# Patient Record
Sex: MALE | Race: WHITE | Employment: FULL TIME | URBAN - METROPOLITAN AREA
[De-identification: names, ages, dates, MRNs, and addresses within clinical notes are randomized per-mention and may not be internally consistent; named-entity substitution may affect disease eponyms.]

---

## 2021-03-22 ENCOUNTER — TRANSFERRED RECORDS (OUTPATIENT)
Dept: HEALTH INFORMATION MANAGEMENT | Facility: CLINIC | Age: 56
End: 2021-03-22

## 2021-03-24 RX ORDER — TRAMADOL HYDROCHLORIDE 50 MG/1
50 TABLET ORAL DAILY PRN
Status: ON HOLD | COMMUNITY
End: 2021-04-16

## 2021-03-24 RX ORDER — AMPICILLIN TRIHYDRATE 250 MG
200 CAPSULE ORAL EVERY MORNING
COMMUNITY

## 2021-03-24 RX ORDER — ATORVASTATIN CALCIUM 80 MG/1
80 TABLET, FILM COATED ORAL EVERY EVENING
COMMUNITY

## 2021-03-24 RX ORDER — LACTOBACILLUS RHAMNOSUS GG 10B CELL
1 CAPSULE ORAL EVERY MORNING
COMMUNITY

## 2021-03-24 RX ORDER — GLIPIZIDE 10 MG/1
10 TABLET, FILM COATED, EXTENDED RELEASE ORAL EVERY MORNING
COMMUNITY

## 2021-03-24 RX ORDER — MULTIVIT-MIN/IRON/FOLIC ACID/K 18-600-40
500 CAPSULE ORAL EVERY MORNING
COMMUNITY

## 2021-03-24 RX ORDER — METOPROLOL SUCCINATE 25 MG/1
25 TABLET, EXTENDED RELEASE ORAL EVERY MORNING
COMMUNITY

## 2021-03-24 RX ORDER — SULINDAC 200 MG/1
200 TABLET ORAL 2 TIMES DAILY
Status: ON HOLD | COMMUNITY
End: 2021-04-17

## 2021-03-24 RX ORDER — HYDROCODONE BITARTRATE AND ACETAMINOPHEN 5; 325 MG/1; MG/1
1 TABLET ORAL DAILY PRN
Status: ON HOLD | COMMUNITY
End: 2021-04-16

## 2021-03-24 RX ORDER — LOSARTAN POTASSIUM 50 MG/1
50 TABLET ORAL EVERY MORNING
COMMUNITY

## 2021-03-24 RX ORDER — ASPIRIN 81 MG/1
81 TABLET ORAL EVERY MORNING
Status: ON HOLD | COMMUNITY
End: 2021-04-16

## 2021-03-24 RX ORDER — FUROSEMIDE 20 MG
20 TABLET ORAL EVERY EVENING
COMMUNITY

## 2021-03-24 RX ORDER — MULTIVIT-MINERALS/FA/LYCOPENE 400-370MCG
1 TABLET ORAL EVERY MORNING
COMMUNITY

## 2021-03-24 RX ORDER — FUROSEMIDE 20 MG
40 TABLET ORAL EVERY MORNING
COMMUNITY

## 2021-03-30 DIAGNOSIS — Z11.59 ENCOUNTER FOR SCREENING FOR OTHER VIRAL DISEASES: ICD-10-CM

## 2021-04-13 DIAGNOSIS — Z11.59 ENCOUNTER FOR SCREENING FOR OTHER VIRAL DISEASES: ICD-10-CM

## 2021-04-13 LAB
SARS-COV-2 RNA RESP QL NAA+PROBE: NORMAL
SPECIMEN SOURCE: NORMAL

## 2021-04-13 PROCEDURE — 87635 SARS-COV-2 COVID-19 AMP PRB: CPT | Performed by: ORTHOPAEDIC SURGERY

## 2021-04-14 LAB
LABORATORY COMMENT REPORT: NORMAL
SARS-COV-2 RNA RESP QL NAA+PROBE: NEGATIVE
SPECIMEN SOURCE: NORMAL

## 2021-04-15 RX ORDER — CLINDAMYCIN HCL 300 MG
600 CAPSULE ORAL 3 TIMES DAILY
Status: ON HOLD | COMMUNITY
End: 2021-04-22

## 2021-04-15 RX ORDER — NITROGLYCERIN 0.4 MG/1
0.4 TABLET SUBLINGUAL DAILY PRN
COMMUNITY

## 2021-04-15 NOTE — PROGRESS NOTES
PTA medications updated by Medication Scribe prior to surgery via phone call with patient        Comments:    Medication history sources: Patient's family/friend (Melisa (spouse)) and H&P  In the past week, patient estimated taking medication this percent of the time: Greater than 90%  Adherence assessment: N/A Not Observed    Significant changes made to the medication list:  Patient reports no longer taking the following meds (med scribe removed from PTA med list): Albuterol Inhaler, Flovent Inhaler       Additional medication history information:   None    The information provided in this note is only as accurate as the sources available at the time of update(s)      Prior to Admission medications    Medication Sig Last Dose Taking? Auth Provider   Ascorbic Acid (VITAMIN C) 500 MG CAPS Take 500 mg by mouth every morning   at AM Yes Reported, Patient   aspirin 81 MG EC tablet Take 81 mg by mouth every morning  4/8/2021 at AM Yes Reported, Patient   atorvastatin (LIPITOR) 80 MG tablet Take 80 mg by mouth every evening  at PM Yes Reported, Patient   clindamycin (CLEOCIN) 300 MG capsule Take 600 mg by mouth 3 times daily  Yes Reported, Patient   Coenzyme Q10 (COQ10) 200 MG CAPS Take 200 mg by mouth every morning  4/8/2021 at AM Yes Reported, Patient   furosemide (LASIX) 20 MG tablet Take 40 mg by mouth every morning (2 X 20 mg) (in addition to PM dose) 4/15/2021 at AM Yes Reported, Patient   furosemide (LASIX) 20 MG tablet Take 20 mg by mouth every evening (in addition to morning dose)  at PM Yes Reported, Patient   glipiZIDE (GLUCOTROL XL) 10 MG 24 hr tablet Take 10 mg by mouth every morning  4/15/2021 at AM Yes Reported, Patient   HYDROcodone-acetaminophen (NORCO) 5-325 MG tablet Take 1 tablet by mouth daily as needed for severe pain 4/12/2021 at PRN Yes Reported, Patient   lactobacillus rhamnosus, GG, (CULTURELL) capsule Take 1 capsule by mouth every morning   at AM Yes Reported, Patient   losartan (COZAAR) 50 MG  tablet Take 50 mg by mouth every morning   at AM Yes Reported, Patient   metoprolol succinate ER (TOPROL-XL) 25 MG 24 hr tablet Take 25 mg by mouth every morning   at AM Yes Reported, Patient   Multiple Vitamins-Minerals (ONE-A-DAY MENS 50+) TABS Take 1 tablet by mouth every morning   at AM Yes Reported, Patient   nitroGLYcerin (NITROSTAT) 0.4 MG sublingual tablet Place 0.4 mg under the tongue daily as needed for chest pain For chest pain place 1 tablet under the tongue every 5 minutes for 3 doses. If symptoms persist 5 minutes after 1st dose call 911.  at PRN Yes Reported, Patient   sulindac (CLINORIL) 200 MG tablet Take 200 mg by mouth 2 times daily  at AM Yes Reported, Patient   traMADol (ULTRAM) 50 MG tablet Take 50 mg by mouth daily as needed for severe pain  at PRN Yes Reported, Patient         Christopher Lang, Imelda  Medication [facility]

## 2021-04-16 ENCOUNTER — ANESTHESIA EVENT (OUTPATIENT)
Dept: SURGERY | Facility: CLINIC | Age: 56
End: 2021-04-16
Payer: COMMERCIAL

## 2021-04-16 ENCOUNTER — ANESTHESIA (OUTPATIENT)
Dept: SURGERY | Facility: CLINIC | Age: 56
End: 2021-04-16
Payer: COMMERCIAL

## 2021-04-16 ENCOUNTER — APPOINTMENT (OUTPATIENT)
Dept: PHYSICAL THERAPY | Facility: CLINIC | Age: 56
End: 2021-04-16
Attending: ORTHOPAEDIC SURGERY
Payer: COMMERCIAL

## 2021-04-16 ENCOUNTER — HOSPITAL ENCOUNTER (OUTPATIENT)
Facility: CLINIC | Age: 56
Discharge: HOME | End: 2021-04-17
Attending: ORTHOPAEDIC SURGERY | Admitting: ORTHOPAEDIC SURGERY
Payer: COMMERCIAL

## 2021-04-16 DIAGNOSIS — Z96.651 STATUS POST TOTAL RIGHT KNEE REPLACEMENT: Primary | ICD-10-CM

## 2021-04-16 LAB
CREAT SERPL-MCNC: 0.88 MG/DL (ref 0.66–1.25)
GFR SERPL CREATININE-BSD FRML MDRD: >90 ML/MIN/{1.73_M2}
GLUCOSE BLDC GLUCOMTR-MCNC: 140 MG/DL (ref 70–99)
GLUCOSE BLDC GLUCOMTR-MCNC: 178 MG/DL (ref 70–99)
GLUCOSE BLDC GLUCOMTR-MCNC: 235 MG/DL (ref 70–99)
GLUCOSE SERPL-MCNC: 122 MG/DL (ref 70–99)
POTASSIUM SERPL-SCNC: 4.1 MMOL/L (ref 3.4–5.3)

## 2021-04-16 PROCEDURE — 250N000011 HC RX IP 250 OP 636: Performed by: ANESTHESIOLOGY

## 2021-04-16 PROCEDURE — 36415 COLL VENOUS BLD VENIPUNCTURE: CPT | Performed by: ANESTHESIOLOGY

## 2021-04-16 PROCEDURE — C1776 JOINT DEVICE (IMPLANTABLE): HCPCS | Performed by: ORTHOPAEDIC SURGERY

## 2021-04-16 PROCEDURE — 97110 THERAPEUTIC EXERCISES: CPT | Mod: GP

## 2021-04-16 PROCEDURE — 258N000001 HC RX 258: Performed by: ORTHOPAEDIC SURGERY

## 2021-04-16 PROCEDURE — 82962 GLUCOSE BLOOD TEST: CPT

## 2021-04-16 PROCEDURE — 258N000003 HC RX IP 258 OP 636: Performed by: NURSE ANESTHETIST, CERTIFIED REGISTERED

## 2021-04-16 PROCEDURE — 84132 ASSAY OF SERUM POTASSIUM: CPT | Performed by: ANESTHESIOLOGY

## 2021-04-16 PROCEDURE — 97161 PT EVAL LOW COMPLEX 20 MIN: CPT | Mod: GP

## 2021-04-16 PROCEDURE — 250N000012 HC RX MED GY IP 250 OP 636 PS 637: Performed by: PHYSICIAN ASSISTANT

## 2021-04-16 PROCEDURE — P9041 ALBUMIN (HUMAN),5%, 50ML: HCPCS | Performed by: NURSE ANESTHETIST, CERTIFIED REGISTERED

## 2021-04-16 PROCEDURE — 250N000011 HC RX IP 250 OP 636: Performed by: ORTHOPAEDIC SURGERY

## 2021-04-16 PROCEDURE — 250N000011 HC RX IP 250 OP 636: Performed by: NURSE ANESTHETIST, CERTIFIED REGISTERED

## 2021-04-16 PROCEDURE — 250N000009 HC RX 250: Performed by: ANESTHESIOLOGY

## 2021-04-16 PROCEDURE — 272N000001 HC OR GENERAL SUPPLY STERILE: Performed by: ORTHOPAEDIC SURGERY

## 2021-04-16 PROCEDURE — 97116 GAIT TRAINING THERAPY: CPT | Mod: GP

## 2021-04-16 PROCEDURE — 250N000011 HC RX IP 250 OP 636: Performed by: PHYSICIAN ASSISTANT

## 2021-04-16 PROCEDURE — 360N000077 HC SURGERY LEVEL 4, PER MIN: Performed by: ORTHOPAEDIC SURGERY

## 2021-04-16 PROCEDURE — 258N000003 HC RX IP 258 OP 636: Performed by: ANESTHESIOLOGY

## 2021-04-16 PROCEDURE — 710N000009 HC RECOVERY PHASE 1, LEVEL 1, PER MIN: Performed by: ORTHOPAEDIC SURGERY

## 2021-04-16 PROCEDURE — 250N000013 HC RX MED GY IP 250 OP 250 PS 637: Performed by: PHYSICIAN ASSISTANT

## 2021-04-16 PROCEDURE — 99207 PR CONSULT E&M CHANGED TO SUBSEQUENT LEVEL: CPT | Performed by: PHYSICIAN ASSISTANT

## 2021-04-16 PROCEDURE — 258N000003 HC RX IP 258 OP 636: Performed by: PHYSICIAN ASSISTANT

## 2021-04-16 PROCEDURE — 278N000051 HC OR IMPLANT GENERAL: Performed by: ORTHOPAEDIC SURGERY

## 2021-04-16 PROCEDURE — 99225 PR SUBSEQUENT OBSERVATION CARE,LEVEL II: CPT | Performed by: PHYSICIAN ASSISTANT

## 2021-04-16 PROCEDURE — 82565 ASSAY OF CREATININE: CPT | Performed by: ANESTHESIOLOGY

## 2021-04-16 PROCEDURE — 82947 ASSAY GLUCOSE BLOOD QUANT: CPT | Performed by: ANESTHESIOLOGY

## 2021-04-16 PROCEDURE — 250N000009 HC RX 250: Performed by: NURSE ANESTHETIST, CERTIFIED REGISTERED

## 2021-04-16 PROCEDURE — 999N000141 HC STATISTIC PRE-PROCEDURE NURSING ASSESSMENT: Performed by: ORTHOPAEDIC SURGERY

## 2021-04-16 PROCEDURE — 370N000017 HC ANESTHESIA TECHNICAL FEE, PER MIN: Performed by: ORTHOPAEDIC SURGERY

## 2021-04-16 PROCEDURE — 96372 THER/PROPH/DIAG INJ SC/IM: CPT | Performed by: PHYSICIAN ASSISTANT

## 2021-04-16 DEVICE — ATTUNE KNEE SYSTEM TIBIAL BASE ROTATING PLATFORM SIZE 6 CEMENTED
Type: IMPLANTABLE DEVICE | Site: KNEE | Status: FUNCTIONAL
Brand: ATTUNE

## 2021-04-16 DEVICE — ATTUNE KNEE SYSTEM TIBIAL INSERT ROTATING PLATFORM POSTERIOR STABILIZED 6 7MM AOX
Type: IMPLANTABLE DEVICE | Site: KNEE | Status: FUNCTIONAL
Brand: ATTUNE

## 2021-04-16 DEVICE — ATTUNE KNEE SYSTEM FEMORAL POSTERIOR STABILIZED SIZE 6 RIGHT CEMENTED
Type: IMPLANTABLE DEVICE | Site: KNEE | Status: FUNCTIONAL
Brand: ATTUNE

## 2021-04-16 DEVICE — BONE CEMENT SIMPLEX 1/2 DOSE 6188-1-001: Type: IMPLANTABLE DEVICE | Site: KNEE | Status: FUNCTIONAL

## 2021-04-16 DEVICE — ATTUNE PATELLA MEDIALIZED DOME 38MM CEMENTED AOX
Type: IMPLANTABLE DEVICE | Site: KNEE | Status: FUNCTIONAL
Brand: ATTUNE

## 2021-04-16 RX ORDER — LIDOCAINE 40 MG/G
CREAM TOPICAL
Status: DISCONTINUED | OUTPATIENT
Start: 2021-04-16 | End: 2021-04-16 | Stop reason: HOSPADM

## 2021-04-16 RX ORDER — ONDANSETRON 2 MG/ML
4 INJECTION INTRAMUSCULAR; INTRAVENOUS EVERY 6 HOURS PRN
Status: DISCONTINUED | OUTPATIENT
Start: 2021-04-16 | End: 2021-04-17 | Stop reason: HOSPADM

## 2021-04-16 RX ORDER — LIDOCAINE 40 MG/G
CREAM TOPICAL
Status: DISCONTINUED | OUTPATIENT
Start: 2021-04-16 | End: 2021-04-17 | Stop reason: HOSPADM

## 2021-04-16 RX ORDER — AMOXICILLIN 250 MG
1 CAPSULE ORAL 2 TIMES DAILY
Status: DISCONTINUED | OUTPATIENT
Start: 2021-04-16 | End: 2021-04-17 | Stop reason: HOSPADM

## 2021-04-16 RX ORDER — SODIUM CHLORIDE, SODIUM LACTATE, POTASSIUM CHLORIDE, CALCIUM CHLORIDE 600; 310; 30; 20 MG/100ML; MG/100ML; MG/100ML; MG/100ML
INJECTION, SOLUTION INTRAVENOUS CONTINUOUS
Status: DISCONTINUED | OUTPATIENT
Start: 2021-04-16 | End: 2021-04-16 | Stop reason: HOSPADM

## 2021-04-16 RX ORDER — DEXTROSE MONOHYDRATE 25 G/50ML
25-50 INJECTION, SOLUTION INTRAVENOUS
Status: DISCONTINUED | OUTPATIENT
Start: 2021-04-16 | End: 2021-04-17 | Stop reason: HOSPADM

## 2021-04-16 RX ORDER — CELECOXIB 200 MG/1
200 CAPSULE ORAL DAILY
Qty: 14 CAPSULE | Refills: 0 | Status: SHIPPED | OUTPATIENT
Start: 2021-04-16 | End: 2021-04-17

## 2021-04-16 RX ORDER — FENTANYL CITRATE 50 UG/ML
50-100 INJECTION, SOLUTION INTRAMUSCULAR; INTRAVENOUS
Status: DISCONTINUED | OUTPATIENT
Start: 2021-04-16 | End: 2021-04-16 | Stop reason: HOSPADM

## 2021-04-16 RX ORDER — NALOXONE HYDROCHLORIDE 0.4 MG/ML
0.2 INJECTION, SOLUTION INTRAMUSCULAR; INTRAVENOUS; SUBCUTANEOUS
Status: DISCONTINUED | OUTPATIENT
Start: 2021-04-16 | End: 2021-04-16

## 2021-04-16 RX ORDER — ONDANSETRON 4 MG/1
4 TABLET, ORALLY DISINTEGRATING ORAL EVERY 30 MIN PRN
Status: DISCONTINUED | OUTPATIENT
Start: 2021-04-16 | End: 2021-04-16 | Stop reason: HOSPADM

## 2021-04-16 RX ORDER — HYDROMORPHONE HCL IN WATER/PF 6 MG/30 ML
0.2 PATIENT CONTROLLED ANALGESIA SYRINGE INTRAVENOUS
Status: DISCONTINUED | OUTPATIENT
Start: 2021-04-16 | End: 2021-04-17 | Stop reason: HOSPADM

## 2021-04-16 RX ORDER — DEXAMETHASONE SODIUM PHOSPHATE 4 MG/ML
INJECTION, SOLUTION INTRA-ARTICULAR; INTRALESIONAL; INTRAMUSCULAR; INTRAVENOUS; SOFT TISSUE PRN
Status: DISCONTINUED | OUTPATIENT
Start: 2021-04-16 | End: 2021-04-16

## 2021-04-16 RX ORDER — TRANEXAMIC ACID 650 MG/1
1950 TABLET ORAL ONCE
Status: DISCONTINUED | OUTPATIENT
Start: 2021-04-16 | End: 2021-04-16 | Stop reason: HOSPADM

## 2021-04-16 RX ORDER — ACETAMINOPHEN 325 MG/1
975 TABLET ORAL EVERY 8 HOURS
Status: DISCONTINUED | OUTPATIENT
Start: 2021-04-16 | End: 2021-04-17 | Stop reason: HOSPADM

## 2021-04-16 RX ORDER — NICOTINE POLACRILEX 4 MG
15-30 LOZENGE BUCCAL
Status: DISCONTINUED | OUTPATIENT
Start: 2021-04-16 | End: 2021-04-17 | Stop reason: HOSPADM

## 2021-04-16 RX ORDER — SODIUM CHLORIDE, SODIUM LACTATE, POTASSIUM CHLORIDE, CALCIUM CHLORIDE 600; 310; 30; 20 MG/100ML; MG/100ML; MG/100ML; MG/100ML
INJECTION, SOLUTION INTRAVENOUS CONTINUOUS
Status: DISCONTINUED | OUTPATIENT
Start: 2021-04-16 | End: 2021-04-17 | Stop reason: HOSPADM

## 2021-04-16 RX ORDER — HYDROXYZINE HYDROCHLORIDE 25 MG/1
25 TABLET, FILM COATED ORAL EVERY 6 HOURS PRN
Status: DISCONTINUED | OUTPATIENT
Start: 2021-04-16 | End: 2021-04-17 | Stop reason: HOSPADM

## 2021-04-16 RX ORDER — CEFAZOLIN SODIUM IN 0.9 % NACL 3 G/100 ML
3 INTRAVENOUS SOLUTION, PIGGYBACK (ML) INTRAVENOUS
Status: COMPLETED | OUTPATIENT
Start: 2021-04-16 | End: 2021-04-16

## 2021-04-16 RX ORDER — DOCUSATE SODIUM 100 MG/1
100 CAPSULE, LIQUID FILLED ORAL 2 TIMES DAILY
Status: DISCONTINUED | OUTPATIENT
Start: 2021-04-16 | End: 2021-04-17 | Stop reason: HOSPADM

## 2021-04-16 RX ORDER — ONDANSETRON 2 MG/ML
INJECTION INTRAMUSCULAR; INTRAVENOUS PRN
Status: DISCONTINUED | OUTPATIENT
Start: 2021-04-16 | End: 2021-04-16

## 2021-04-16 RX ORDER — NALOXONE HYDROCHLORIDE 0.4 MG/ML
0.4 INJECTION, SOLUTION INTRAMUSCULAR; INTRAVENOUS; SUBCUTANEOUS
Status: DISCONTINUED | OUTPATIENT
Start: 2021-04-16 | End: 2021-04-16

## 2021-04-16 RX ORDER — NALOXONE HYDROCHLORIDE 0.4 MG/ML
0.4 INJECTION, SOLUTION INTRAMUSCULAR; INTRAVENOUS; SUBCUTANEOUS
Status: DISCONTINUED | OUTPATIENT
Start: 2021-04-16 | End: 2021-04-17 | Stop reason: HOSPADM

## 2021-04-16 RX ORDER — METHOCARBAMOL 750 MG/1
750 TABLET, FILM COATED ORAL EVERY 6 HOURS PRN
Status: DISCONTINUED | OUTPATIENT
Start: 2021-04-16 | End: 2021-04-17 | Stop reason: HOSPADM

## 2021-04-16 RX ORDER — BISACODYL 10 MG
10 SUPPOSITORY, RECTAL RECTAL DAILY PRN
Status: DISCONTINUED | OUTPATIENT
Start: 2021-04-16 | End: 2021-04-17 | Stop reason: HOSPADM

## 2021-04-16 RX ORDER — HYDROXYZINE HYDROCHLORIDE 25 MG/1
25 TABLET, FILM COATED ORAL EVERY 6 HOURS PRN
Qty: 30 TABLET | Refills: 0 | Status: SHIPPED | OUTPATIENT
Start: 2021-04-16

## 2021-04-16 RX ORDER — ALBUMIN, HUMAN INJ 5% 5 %
SOLUTION INTRAVENOUS CONTINUOUS PRN
Status: DISCONTINUED | OUTPATIENT
Start: 2021-04-16 | End: 2021-04-16

## 2021-04-16 RX ORDER — CEFAZOLIN SODIUM 2 G/100ML
2 INJECTION, SOLUTION INTRAVENOUS EVERY 8 HOURS
Status: COMPLETED | OUTPATIENT
Start: 2021-04-16 | End: 2021-04-17

## 2021-04-16 RX ORDER — CEFAZOLIN SODIUM IN 0.9 % NACL 3 G/100 ML
3 INTRAVENOUS SOLUTION, PIGGYBACK (ML) INTRAVENOUS
Status: CANCELLED | OUTPATIENT
Start: 2021-04-16

## 2021-04-16 RX ORDER — OXYCODONE HYDROCHLORIDE 5 MG/1
10 TABLET ORAL EVERY 4 HOURS PRN
Status: DISCONTINUED | OUTPATIENT
Start: 2021-04-16 | End: 2021-04-17 | Stop reason: HOSPADM

## 2021-04-16 RX ORDER — PROPOFOL 10 MG/ML
INJECTION, EMULSION INTRAVENOUS CONTINUOUS PRN
Status: DISCONTINUED | OUTPATIENT
Start: 2021-04-16 | End: 2021-04-16

## 2021-04-16 RX ORDER — POLYETHYLENE GLYCOL 3350 17 G/17G
17 POWDER, FOR SOLUTION ORAL DAILY
Status: DISCONTINUED | OUTPATIENT
Start: 2021-04-17 | End: 2021-04-17 | Stop reason: HOSPADM

## 2021-04-16 RX ORDER — HYDROMORPHONE HYDROCHLORIDE 1 MG/ML
0.4 INJECTION, SOLUTION INTRAMUSCULAR; INTRAVENOUS; SUBCUTANEOUS
Status: DISCONTINUED | OUTPATIENT
Start: 2021-04-16 | End: 2021-04-17 | Stop reason: HOSPADM

## 2021-04-16 RX ORDER — OXYCODONE HYDROCHLORIDE 5 MG/1
5 TABLET ORAL EVERY 4 HOURS PRN
Status: DISCONTINUED | OUTPATIENT
Start: 2021-04-16 | End: 2021-04-17 | Stop reason: HOSPADM

## 2021-04-16 RX ORDER — ONDANSETRON 4 MG/1
4 TABLET, ORALLY DISINTEGRATING ORAL EVERY 6 HOURS PRN
Status: DISCONTINUED | OUTPATIENT
Start: 2021-04-16 | End: 2021-04-17 | Stop reason: HOSPADM

## 2021-04-16 RX ORDER — NALOXONE HYDROCHLORIDE 0.4 MG/ML
0.2 INJECTION, SOLUTION INTRAMUSCULAR; INTRAVENOUS; SUBCUTANEOUS
Status: DISCONTINUED | OUTPATIENT
Start: 2021-04-16 | End: 2021-04-17 | Stop reason: HOSPADM

## 2021-04-16 RX ORDER — ACETAMINOPHEN 325 MG/1
650 TABLET ORAL EVERY 4 HOURS PRN
Qty: 100 TABLET | Refills: 0 | Status: SHIPPED | OUTPATIENT
Start: 2021-04-16

## 2021-04-16 RX ORDER — METOPROLOL SUCCINATE 25 MG/1
25 TABLET, EXTENDED RELEASE ORAL EVERY MORNING
Status: DISCONTINUED | OUTPATIENT
Start: 2021-04-17 | End: 2021-04-17 | Stop reason: HOSPADM

## 2021-04-16 RX ORDER — HYDROMORPHONE HYDROCHLORIDE 1 MG/ML
.3-.5 INJECTION, SOLUTION INTRAMUSCULAR; INTRAVENOUS; SUBCUTANEOUS EVERY 5 MIN PRN
Status: DISCONTINUED | OUTPATIENT
Start: 2021-04-16 | End: 2021-04-16 | Stop reason: HOSPADM

## 2021-04-16 RX ORDER — CEFAZOLIN SODIUM 2 G/100ML
2 INJECTION, SOLUTION INTRAVENOUS
Status: DISCONTINUED | OUTPATIENT
Start: 2021-04-16 | End: 2021-04-16 | Stop reason: HOSPADM

## 2021-04-16 RX ORDER — FENTANYL CITRATE 50 UG/ML
25-50 INJECTION, SOLUTION INTRAMUSCULAR; INTRAVENOUS
Status: DISCONTINUED | OUTPATIENT
Start: 2021-04-16 | End: 2021-04-16 | Stop reason: HOSPADM

## 2021-04-16 RX ORDER — ACETAMINOPHEN 325 MG/1
650 TABLET ORAL EVERY 4 HOURS PRN
Status: DISCONTINUED | OUTPATIENT
Start: 2021-04-19 | End: 2021-04-17 | Stop reason: HOSPADM

## 2021-04-16 RX ORDER — FENTANYL CITRATE 50 UG/ML
INJECTION, SOLUTION INTRAMUSCULAR; INTRAVENOUS PRN
Status: DISCONTINUED | OUTPATIENT
Start: 2021-04-16 | End: 2021-04-16

## 2021-04-16 RX ORDER — AMOXICILLIN 250 MG
1-2 CAPSULE ORAL 2 TIMES DAILY
Qty: 30 TABLET | Refills: 0 | Status: SHIPPED | OUTPATIENT
Start: 2021-04-16

## 2021-04-16 RX ORDER — LOSARTAN POTASSIUM 50 MG/1
50 TABLET ORAL EVERY MORNING
Status: DISCONTINUED | OUTPATIENT
Start: 2021-04-17 | End: 2021-04-17 | Stop reason: HOSPADM

## 2021-04-16 RX ORDER — PROCHLORPERAZINE MALEATE 10 MG
10 TABLET ORAL EVERY 6 HOURS PRN
Status: DISCONTINUED | OUTPATIENT
Start: 2021-04-16 | End: 2021-04-17 | Stop reason: HOSPADM

## 2021-04-16 RX ORDER — METOPROLOL TARTRATE 1 MG/ML
5 INJECTION, SOLUTION INTRAVENOUS EVERY 6 HOURS
Status: DISCONTINUED | OUTPATIENT
Start: 2021-04-17 | End: 2021-04-16

## 2021-04-16 RX ORDER — DIPHENHYDRAMINE HCL 25 MG
25 CAPSULE ORAL EVERY 6 HOURS PRN
Status: DISCONTINUED | OUTPATIENT
Start: 2021-04-16 | End: 2021-04-17 | Stop reason: HOSPADM

## 2021-04-16 RX ORDER — BUPIVACAINE HYDROCHLORIDE 7.5 MG/ML
INJECTION, SOLUTION INTRASPINAL PRN
Status: DISCONTINUED | OUTPATIENT
Start: 2021-04-16 | End: 2021-04-16

## 2021-04-16 RX ORDER — ONDANSETRON 2 MG/ML
4 INJECTION INTRAMUSCULAR; INTRAVENOUS EVERY 30 MIN PRN
Status: DISCONTINUED | OUTPATIENT
Start: 2021-04-16 | End: 2021-04-16 | Stop reason: HOSPADM

## 2021-04-16 RX ORDER — KETOROLAC TROMETHAMINE 15 MG/ML
15 INJECTION, SOLUTION INTRAMUSCULAR; INTRAVENOUS EVERY 6 HOURS
Status: COMPLETED | OUTPATIENT
Start: 2021-04-16 | End: 2021-04-17

## 2021-04-16 RX ORDER — CEFAZOLIN SODIUM 2 G/100ML
2 INJECTION, SOLUTION INTRAVENOUS SEE ADMIN INSTRUCTIONS
Status: DISCONTINUED | OUTPATIENT
Start: 2021-04-16 | End: 2021-04-16 | Stop reason: HOSPADM

## 2021-04-16 RX ORDER — OXYCODONE HYDROCHLORIDE 5 MG/1
5-10 TABLET ORAL
Qty: 30 TABLET | Refills: 0 | Status: ON HOLD | OUTPATIENT
Start: 2021-04-16 | End: 2021-04-22

## 2021-04-16 RX ADMIN — Medication 3 G: at 07:52

## 2021-04-16 RX ADMIN — PHENYLEPHRINE HYDROCHLORIDE 100 MCG: 10 INJECTION INTRAVENOUS at 07:59

## 2021-04-16 RX ADMIN — DOCUSATE SODIUM 100 MG: 100 CAPSULE, LIQUID FILLED ORAL at 20:07

## 2021-04-16 RX ADMIN — FENTANYL CITRATE 25 MCG: 50 INJECTION, SOLUTION INTRAMUSCULAR; INTRAVENOUS at 09:36

## 2021-04-16 RX ADMIN — SODIUM CHLORIDE, POTASSIUM CHLORIDE, SODIUM LACTATE AND CALCIUM CHLORIDE: 600; 310; 30; 20 INJECTION, SOLUTION INTRAVENOUS at 08:44

## 2021-04-16 RX ADMIN — ONDANSETRON 4 MG: 2 INJECTION INTRAMUSCULAR; INTRAVENOUS at 07:51

## 2021-04-16 RX ADMIN — BUPIVACAINE HYDROCHLORIDE 15 ML: 5 INJECTION, SOLUTION EPIDURAL; INTRACAUDAL; PERINEURAL at 07:04

## 2021-04-16 RX ADMIN — PHENYLEPHRINE HYDROCHLORIDE 100 MCG: 10 INJECTION INTRAVENOUS at 08:03

## 2021-04-16 RX ADMIN — Medication 2 G: at 09:50

## 2021-04-16 RX ADMIN — SENNOSIDES AND DOCUSATE SODIUM 1 TABLET: 8.6; 5 TABLET ORAL at 20:07

## 2021-04-16 RX ADMIN — PHENYLEPHRINE HYDROCHLORIDE 200 MCG: 10 INJECTION INTRAVENOUS at 08:11

## 2021-04-16 RX ADMIN — KETOROLAC TROMETHAMINE 15 MG: 15 INJECTION, SOLUTION INTRAMUSCULAR; INTRAVENOUS at 20:07

## 2021-04-16 RX ADMIN — PHENYLEPHRINE HYDROCHLORIDE 100 MCG: 10 INJECTION INTRAVENOUS at 07:54

## 2021-04-16 RX ADMIN — PHENYLEPHRINE HYDROCHLORIDE 0.25 MCG/KG/MIN: 10 INJECTION INTRAVENOUS at 08:26

## 2021-04-16 RX ADMIN — BUPIVACAINE HYDROCHLORIDE IN DEXTROSE 12 MG: 7.5 INJECTION, SOLUTION SUBARACHNOID at 07:49

## 2021-04-16 RX ADMIN — MIDAZOLAM 1 MG: 1 INJECTION INTRAMUSCULAR; INTRAVENOUS at 07:43

## 2021-04-16 RX ADMIN — DEXAMETHASONE SODIUM PHOSPHATE 4 MG: 4 INJECTION, SOLUTION INTRA-ARTICULAR; INTRALESIONAL; INTRAMUSCULAR; INTRAVENOUS; SOFT TISSUE at 07:51

## 2021-04-16 RX ADMIN — MIDAZOLAM 1 MG: 1 INJECTION INTRAMUSCULAR; INTRAVENOUS at 07:49

## 2021-04-16 RX ADMIN — MIDAZOLAM HYDROCHLORIDE 2 MG: 1 INJECTION, SOLUTION INTRAMUSCULAR; INTRAVENOUS at 07:01

## 2021-04-16 RX ADMIN — FENTANYL CITRATE 50 MCG: 50 INJECTION, SOLUTION INTRAMUSCULAR; INTRAVENOUS at 07:49

## 2021-04-16 RX ADMIN — OXYCODONE HYDROCHLORIDE 10 MG: 5 TABLET ORAL at 15:56

## 2021-04-16 RX ADMIN — FENTANYL CITRATE 25 MCG: 50 INJECTION, SOLUTION INTRAMUSCULAR; INTRAVENOUS at 09:10

## 2021-04-16 RX ADMIN — PHENYLEPHRINE HYDROCHLORIDE 100 MCG: 10 INJECTION INTRAVENOUS at 08:16

## 2021-04-16 RX ADMIN — ACETAMINOPHEN 975 MG: 325 TABLET, FILM COATED ORAL at 22:09

## 2021-04-16 RX ADMIN — INSULIN ASPART 2 UNITS: 100 INJECTION, SOLUTION INTRAVENOUS; SUBCUTANEOUS at 18:29

## 2021-04-16 RX ADMIN — PHENYLEPHRINE HYDROCHLORIDE 100 MCG: 10 INJECTION INTRAVENOUS at 10:08

## 2021-04-16 RX ADMIN — ASPIRIN 325 MG: 325 TABLET, COATED ORAL at 13:45

## 2021-04-16 RX ADMIN — PHENYLEPHRINE HYDROCHLORIDE 100 MCG: 10 INJECTION INTRAVENOUS at 08:21

## 2021-04-16 RX ADMIN — KETOROLAC TROMETHAMINE 15 MG: 15 INJECTION, SOLUTION INTRAMUSCULAR; INTRAVENOUS at 14:02

## 2021-04-16 RX ADMIN — PROPOFOL 75 MCG/KG/MIN: 10 INJECTION, EMULSION INTRAVENOUS at 07:52

## 2021-04-16 RX ADMIN — PHENYLEPHRINE HYDROCHLORIDE 200 MCG: 10 INJECTION INTRAVENOUS at 08:06

## 2021-04-16 RX ADMIN — ACETAMINOPHEN 975 MG: 325 TABLET, FILM COATED ORAL at 13:44

## 2021-04-16 RX ADMIN — SODIUM CHLORIDE, POTASSIUM CHLORIDE, SODIUM LACTATE AND CALCIUM CHLORIDE: 600; 310; 30; 20 INJECTION, SOLUTION INTRAVENOUS at 07:42

## 2021-04-16 RX ADMIN — PHENYLEPHRINE HYDROCHLORIDE 100 MCG: 10 INJECTION INTRAVENOUS at 07:50

## 2021-04-16 RX ADMIN — SODIUM CHLORIDE, POTASSIUM CHLORIDE, SODIUM LACTATE AND CALCIUM CHLORIDE: 600; 310; 30; 20 INJECTION, SOLUTION INTRAVENOUS at 13:57

## 2021-04-16 RX ADMIN — CEFAZOLIN SODIUM 2 G: 2 INJECTION, SOLUTION INTRAVENOUS at 18:17

## 2021-04-16 RX ADMIN — OXYCODONE HYDROCHLORIDE 10 MG: 5 TABLET ORAL at 22:09

## 2021-04-16 RX ADMIN — PHENYLEPHRINE HYDROCHLORIDE 100 MCG: 10 INJECTION INTRAVENOUS at 08:19

## 2021-04-16 RX ADMIN — ALBUMIN HUMAN: 0.05 INJECTION, SOLUTION INTRAVENOUS at 08:41

## 2021-04-16 SDOH — HEALTH STABILITY: MENTAL HEALTH: HOW OFTEN DO YOU HAVE A DRINK CONTAINING ALCOHOL?: NEVER

## 2021-04-16 ASSESSMENT — LIFESTYLE VARIABLES: TOBACCO_USE: 1

## 2021-04-16 ASSESSMENT — MIFFLIN-ST. JEOR: SCORE: 2275.95

## 2021-04-16 ASSESSMENT — ACTIVITIES OF DAILY LIVING (ADL): TOILETING_ISSUES: NO

## 2021-04-16 NOTE — CONSULTS
[facility]  Consult Note - Hospitalist Service     Date of Admission:  4/16/2021  Consult Requested by: Denisse Yun PA-C  Reason for Consult: Post-op co-medical management of chronic comorbidities (CAD, HTN, HLP, DM2) following Right TKA.    PRIMARY CARE PROVIDER:    Uma Pereyra    Assessment & Plan   Calvin Damon is a 55 year old male admitted on 4/16/2021.    Past medical history significant for OA, CAD, HTN, HLP, DM2, Obesity, Tobacco Use D/O, Chronic lower extremity edema, History of asthma, History of provoked DVT who underwent an elective right TKA.      OA with degenerative changes of the right knee s/p right TKA  POD# 0.   - Orthopedic Service is managing.   --Defer analgesic management, DVT prophylaxis, PT/OT.    - HGB check in the morning.    - Encourage utilization of incentive spirometer.     CAD  HTN  HLP  History of MI and s/p cardiac stenting.    - Defer resumption of ASA therapy to Ortho.     --Currently started on  mg/d for DVT prophylaxis.    - Hold PTA Lipitor 80 mg at bedtime.  Resume at discharge.    - Resume PTA losartan 50 mg/d and Toprol-XL 25 mg every morning.  Hold parameters in place.  - BMP in the morning.      Chronic lower extremity edema  Lymphedema  Managed PTA with lasix 40 mg every morning and 20 mg every afternoon.  - Compression stockings at home.  oon.    - Hold PTA Lasix while receiving IV Fluids.  Resume at discharge.      DM2  Managed PTA with glipizide XL 10 mg/d.  Last A1c per Care Every from 2018 of 6.7%.   - Check A1c.    - Hold PTA glipizide, resume at discharge.    - Medium intensity sliding scale insulin.    - Glucose checks QID and at 2AM.    - Hypoglycemic protocol.        Obesity  Body mass index is 42.25 kg/m .  Increase in all-cause morbidity and mortality.   - Encourage weight loss.  - Follow up with PCP regarding ongoing management.    - Continue CPAP with home settings.      Tobacco Use D/O  Currently smoking  "about half pack per day.    - Counseled regarding cessation.    - Patient declined nicotine replacement therapy.      History of asthma  Not currently on any medications.      History of provoked DVT  - Prophylaxis deferred to Ortho.      Diet: Advance Diet as Tolerated: Regular Diet Adult  Discharge Instruction - Regular Diet Adult     DVT Prophylaxis: Defer to primary service   Topete Catheter: not present  Code Status: Full Code     Disposition Plan    Expected discharge: Per Ortho.   Entered: Wicho Anglin PA-C 04/16/2021, 2:39 PM        The patient's care was discussed with the Patient and Patient's Family.    The patient has been discussed with Dr. Brady, who agrees with the assessment and plan at this time. Dr. Brady will evaluate the patient independently.     At this time, I'd like to thank Denisse Yun PA-C for consulting the Hospitalist service.  We will continue to follow.        Wicho Anglin PA-C  [facility]    ______________________________________________________________________    Chief Complaint   Elective right TKA.      History is obtained from the patient and EMR.      History of Present Illness   Calvin Damon is a 55 year old male with a past medical history significant for OA, CAD, HTN, HLP, DM2, Obesity, Tobacco Use D/O, Chronic lower extremity edema, History of asthma, History of provoked DVT who underwent an elective right TKA.     Surgery was performed under general anesthesia.  EBL documented at less than 10 ml.      Patient was seen in his hospital room with patient's wife present in the room.  They mentioned chronic skin changes of his legs (both) and indicated it was a \"cellultitis\" but he is not on any antibiotics and just being watched.  Patient mentioned he recently injured his right leg.  He scraped it and caused a wound.  This was evaluated pre-operatively and was told it is fine.      Reviewed plans with patient in regards " to management of blood pressure (hold parameters), diabetes (holding oral medication and use of subcutaneous insulin).  Patient understood and agreed with this plan.      Review of Systems   The 10 point Review of Systems is negative other than noted in the HPI.    Past Medical History    I have reviewed this patient's medical history and updated it with pertinent information if needed.   Past Medical History:   Diagnosis Date     CAD (coronary artery disease)      Diabetes (H)      H/O blood clots      Hypertension      Uncomplicated asthma    OA, CAD, HTN, HLP, DM2, Obesity, Tobacco Use D/O, Chronic lower extremity edema, History of asthma, History of provoked DVT     Past Surgical History   I have reviewed this patient's surgical history and updated it with pertinent information if needed.  Past Surgical History:   Procedure Laterality Date     CARDIAC SURGERY  01/2017    stent     HERNIA REPAIR      umbilical     ORTHOPEDIC SURGERY      right arthroscopic shoulder     ORTHOPEDIC SURGERY Left 2007    plate upper arm     ORTHOPEDIC SURGERY Right 2018    ankle repair     ORTHOPEDIC SURGERY Right 07/2020    arthroscopy knee     ORTHOPEDIC SURGERY Right 1997    knee arthroscopy       Social History   I have reviewed this patient's social history and updated it with pertinent information if needed.  Patient resides in a house in [place], [place] with his wife.  He continues to smoke up to half pack per day.  He does not consume alcohol.  He does not use illicit drugs.  He has both a cane and walker and utilizes both depending on the activity or duration of walking.    Social History     Tobacco Use     Smoking status: Current Every Day Smoker     Packs/day: 0.25     Types: Cigarettes     Smokeless tobacco: Never Used   Substance Use Topics     Alcohol use: Never     Frequency: Never     Drug use: None       Family History   I have reviewed this patient's family history and updated it with pertinent information if  needed.   History reviewed. No pertinent family history.   Mother: , had breast cancer with mets.      Medications   Prior to Admission Medications   Prescriptions Last Dose Informant Patient Reported? Taking?   Ascorbic Acid (VITAMIN C) 500 MG CAPS 4/15/2021 at AM Spouse/Significant Other Yes Yes   Sig: Take 500 mg by mouth every morning    Coenzyme Q10 (COQ10) 200 MG CAPS Past Month at AM Spouse/Significant Other Yes Yes   Sig: Take 200 mg by mouth every morning    HYDROcodone-acetaminophen (NORCO) 5-325 MG tablet Past Week at PRN Spouse/Significant Other Yes No   Sig: Take 1 tablet by mouth daily as needed for severe pain   Multiple Vitamins-Minerals (ONE-A-DAY MENS 50+) TABS 2021 at AM Spouse/Significant Other Yes Yes   Sig: Take 1 tablet by mouth every morning    aspirin 81 MG EC tablet Past Month at AM Spouse/Significant Other Yes No   Sig: Take 81 mg by mouth every morning    atorvastatin (LIPITOR) 80 MG tablet 4/15/2021 at PM Spouse/Significant Other Yes Yes   Sig: Take 80 mg by mouth every evening   clindamycin (CLEOCIN) 300 MG capsule 4/15/2021 at Unknown time Spouse/Significant Other Yes Yes   Sig: Take 600 mg by mouth 3 times daily   furosemide (LASIX) 20 MG tablet 4/15/2021 at AM Spouse/Significant Other Yes Yes   Sig: Take 40 mg by mouth every morning (2 X 20 mg) (in addition to PM dose)   furosemide (LASIX) 20 MG tablet 4/15/2021 at 1700 Spouse/Significant Other Yes Yes   Sig: Take 20 mg by mouth every evening (in addition to morning dose)   glipiZIDE (GLUCOTROL XL) 10 MG 24 hr tablet 4/15/2021 at AM Spouse/Significant Other Yes Yes   Sig: Take 10 mg by mouth every morning    lactobacillus rhamnosus, GG, (CULTURELL) capsule 2021 at AM Spouse/Significant Other Yes Yes   Sig: Take 1 capsule by mouth every morning    losartan (COZAAR) 50 MG tablet 2021 at AM Spouse/Significant Other Yes Yes   Sig: Take 50 mg by mouth every morning    metoprolol succinate ER (TOPROL-XL) 25 MG 24 hr  tablet 4/16/2021 at AM Spouse/Significant Other Yes Yes   Sig: Take 25 mg by mouth every morning    nitroGLYcerin (NITROSTAT) 0.4 MG sublingual tablet Unknown at PRN Spouse/Significant Other Yes No   Sig: Place 0.4 mg under the tongue daily as needed for chest pain For chest pain place 1 tablet under the tongue every 5 minutes for 3 doses. If symptoms persist 5 minutes after 1st dose call 911.   sulindac (CLINORIL) 200 MG tablet 4/16/2021 at AM Spouse/Significant Other Yes Yes   Sig: Take 200 mg by mouth 2 times daily   traMADol (ULTRAM) 50 MG tablet 4/15/2021 at PRN Spouse/Significant Other Yes No   Sig: Take 50 mg by mouth daily as needed for severe pain      Facility-Administered Medications: None     Allergies   Allergies   Allergen Reactions     Metformin Diarrhea     Synvisc [Hylan G-F 20] Rash       Physical Exam   Vital Signs: Temp: 97.7  F (36.5  C) Temp src: Oral BP: 125/72 Pulse: 82   Resp: 18 SpO2: 99 % O2 Device: None (Room air) Oxygen Delivery: 2 LPM  Weight: 311 lbs 8 oz    Constitutional: Awake, alert, cooperative, no apparent distress.    ENT: Normocephalic, without obvious abnormality, atraumatic, oral pharynx with moist mucus membranes, tonsils without erythema or exudates.  Eyes pupils are equal, round and reactive to light; extra occular movements intact.  Normal sclera.    Neck: Supple, symmetrical, trachea midline, no adenopathy.  Pulmonary: No increased work of breathing, good air exchange, clear to auscultation bilaterally, no crackles or wheezing.  Cardiovascular: Regular rate and rhythm, normal S1 and S2, no S3 or S4, and no murmur noted.  GI: Normal bowel sounds, soft, non-distended, non-tender.  Obese.  Skin/Integumen: Lower extremity skin changes with erythema/hyperpigmentation.    Neuro: CN II-XII grossly intact.  Upper extremities strength, coordination and sensation intact bilaterally.  Deferred lower extremity assessment due to post-op state.  Dorsal and plantar flexion intact and  even bilaterally.    Psych:  Alert and oriented x 3. Normal affect.  Extremities: 1+ lower extremity edema noted, and calves are non-tender to palpation bilaterally.        Data   I personally reviewed no images or EKG's today.  Results for orders placed or performed during the hospital encounter of 04/16/21 (from the past 24 hour(s))   Creatinine   Result Value Ref Range    Creatinine 0.88 0.66 - 1.25 mg/dL    GFR Estimate >90 >60 mL/min/[1.73_m2]    GFR Estimate If Black >90 >60 mL/min/[1.73_m2]   Potassium   Result Value Ref Range    Potassium 4.1 3.4 - 5.3 mmol/L   Glucose   Result Value Ref Range    Glucose 122 (H) 70 - 99 mg/dL   Glucose by meter   Result Value Ref Range    Glucose 140 (H) 70 - 99 mg/dL

## 2021-04-16 NOTE — ANESTHESIA POSTPROCEDURE EVALUATION
Patient: Calvin Damon    Procedure(s):  RIGHT TOTAL KNEE ARTHROPLASTY    Diagnosis:Osteoarthritis [M19.90]  Diagnosis Additional Information: No value filed.    Anesthesia Type:  Spinal    Note:  Disposition: Inpatient; Admission   Postop Pain Control: Uneventful            Sign Out: Well controlled pain   PONV: No   Neuro/Psych: Uneventful            Sign Out: Acceptable/Baseline neuro status   Airway/Respiratory: Uneventful            Sign Out: Acceptable/Baseline resp. status   CV/Hemodynamics: Uneventful            Sign Out: Acceptable CV status   Other NRE: NONE   DID A NON-ROUTINE EVENT OCCUR? No         Last vitals:  Vitals:    04/16/21 1220 04/16/21 1250 04/16/21 1350   BP: 125/72 (!) 142/74 125/72   Pulse: 82 89 82   Resp: 20 16 18   Temp: 36.5  C (97.7  F) 36.4  C (97.5  F) 36.5  C (97.7  F)   SpO2: 99% 99% 99%       Last vitals prior to Anesthesia Care Transfer:  CRNA VITALS  4/16/2021 1000 - 4/16/2021 1100      4/16/2021             Resp Rate (set):  10          Electronically Signed By: Berry Ruiz MD  April 16, 2021  3:12 PM

## 2021-04-16 NOTE — ANESTHESIA PROCEDURE NOTES
Intrathecal Procedure Note  Pre-Procedure   Staff -        Anesthesiologist:  Berry Ruiz MD       Performed By: Anesthesiologist and anesthesiologist       Location: OR       Pre-Anesthestic Checklist: patient identified, IV checked, risks and benefits discussed, informed consent, monitors and equipment checked and pre-op evaluation  Timeout:       Correct Patient: Yes        Correct Procedure: Yes        Correct Site: Yes        Correct Position: Yes   Procedure Documentation  Procedure: intrathecal       Patient Position: sitting       Patient Prep/Sterile Barriers: sterile gloves, mask, patient draped       Skin prep: Betadine       Insertion Site: L3-4. (midline approach).       Needle Gauge: 24.        Needle Length (Inches): 5        Spinal Needle Type: Felicity-Lavon       Introducer used       Introducer: 20 G      # of attempts: 2 and  # of redirects: : 0.    Assessment/Narrative         Paresthesias: No.       CSF fluid: clear.    Comments:  Clear CSF first pass.  Free flow pre and post injection.  No abnormal pain or paresthesia throughout.  Patient tolerated well.     No complications.

## 2021-04-16 NOTE — ANESTHESIA PREPROCEDURE EVALUATION
Anesthesia Pre-Procedure Evaluation    Patient: Calvin Damon   MRN: [id number] : 1965        Preoperative Diagnosis: Osteoarthritis [M19.90]   Procedure : Procedure(s):  RIGHT TOTAL KNEE ARTHROPLASTY     Past Medical History:   Diagnosis Date     CAD (coronary artery disease)      Diabetes (H)      H/O blood clots      Hypertension      Uncomplicated asthma       Past Surgical History:   Procedure Laterality Date     CARDIAC SURGERY  2017    stent     HERNIA REPAIR      umbilical     ORTHOPEDIC SURGERY      right arthroscopic shoulder     ORTHOPEDIC SURGERY Left     plate upper arm     ORTHOPEDIC SURGERY Right 2018    ankle repair     ORTHOPEDIC SURGERY Right 2020    arthroscopy knee     ORTHOPEDIC SURGERY Right     knee arthroscopy      Allergies   Allergen Reactions     Metformin Diarrhea     Synvisc [Hylan G-F 20] Rash      Social History     Tobacco Use     Smoking status: Current Every Day Smoker     Packs/day: 0.25     Types: Cigarettes     Smokeless tobacco: Never Used   Substance Use Topics     Alcohol use: Never     Frequency: Never      Wt Readings from Last 1 Encounters:   21 141.3 kg (311 lb 8 oz)        Anesthesia Evaluation   Pt has had prior anesthetic. Type: General.        ROS/MED HX  ENT/Pulmonary:     (+) tobacco use, Current use, Intermittent, asthma  (-) sleep apnea   Neurologic:       Cardiovascular:     (+) Dyslipidemia hypertension--CAD --stent- (-) CHF   METS/Exercise Tolerance: >4 METS    Hematologic:       Musculoskeletal:   (+) arthritis,     GI/Hepatic:  - neg GI/hepatic ROS     Renal/Genitourinary:       Endo:     (+) type I DM, Obesity,     Psychiatric/Substance Use:       Infectious Disease:       Malignancy:       Other:            Physical Exam    Airway        Mallampati: III   TM distance: > 3 FB   Neck ROM: full   Mouth opening: > 3 cm    Respiratory Devices and Support         Dental  no notable dental history         Cardiovascular    cardiovascular exam normal          Pulmonary           (+) decreased breath sounds and wheezes           OUTSIDE LABS:  CBC: No results found for: WBC, HGB, HCT, PLT  BMP:   Lab Results   Component Value Date    POTASSIUM 4.1 04/16/2021    CR 0.88 04/16/2021     (H) 04/16/2021     COAGS: No results found for: PTT, INR, FIBR  POC: No results found for: BGM, HCG, HCGS  HEPATIC: No results found for: ALBUMIN, PROTTOTAL, ALT, AST, GGT, ALKPHOS, BILITOTAL, BILIDIRECT, FERNANDO  OTHER: No results found for: PH, LACT, A1C, SIDNEY, PHOS, MAG, LIPASE, AMYLASE, TSH, T4, T3, CRP, SED    Anesthesia Plan    ASA Status:  3      Anesthesia Type: Spinal.              Consents    Anesthesia Plan(s) and associated risks, benefits, and realistic alternatives discussed. Questions answered and patient/representative(s) expressed understanding.     - Discussed with:  Patient    Use of blood products discussed: Yes.     - Consented: consented to blood products     Postoperative Care    Pain management: IV analgesics, Oral pain medications, Peripheral nerve block (Single Shot).   PONV prophylaxis: Dexamethasone or Solumedrol     Comments:                Berry Ruiz MD

## 2021-04-16 NOTE — ANESTHESIA CARE TRANSFER NOTE
Patient: Calvin Damon    Procedure(s):  RIGHT TOTAL KNEE ARTHROPLASTY    Diagnosis: Osteoarthritis [M19.90]  Diagnosis Additional Information: No value filed.    Anesthesia Type:   Spinal     Note:    Oropharynx: oropharynx clear of all foreign objects and spontaneously breathing  Level of Consciousness: awake  Oxygen Supplementation: nasal cannula  Level of Supplemental Oxygen (L/min / FiO2): 3 LPM  Independent Airway: airway patency satisfactory and stable  Dentition: dentition unchanged  Vital Signs Stable: post-procedure vital signs reviewed and stable  Report to RN Given: handoff report given  Patient transferred to: PACU    Handoff Report: Identifed the Patient, Identified the Reponsible Provider, Reviewed the pertinent medical history, Discussed the surgical course, Reviewed Intra-OP anesthesia mangement and issues during anesthesia, Set expectations for post-procedure period and Allowed opportunity for questions and acknowledgement of understanding      Vitals: (Last set prior to Anesthesia Care Transfer)  CRNA VITALS  4/16/2021 1000 - 4/16/2021 1035      4/16/2021             Resp Rate (set):  10        Electronically Signed By: CAITLIN Valenzuela CRNA  April 16, 2021  10:35 AM

## 2021-04-16 NOTE — ANESTHESIA PROCEDURE NOTES
Femoral Procedure Note  Pre-Procedure   Staff -        Anesthesiologist:  Berry Ruiz MD       Performed By: anesthesiologist       Location: pre-op       Pre-Anesthestic Checklist: patient identified, IV checked, site marked, risks and benefits discussed, informed consent, monitors and equipment checked, pre-op evaluation, at physician/surgeon's request and post-op pain management  Timeout:       Correct Patient: Yes        Correct Procedure: Yes        Correct Site: Yes        Correct Position: Yes        Correct Laterality: Yes        Site Marked: Yes  Procedure Documentation  Procedure: Femoral       Laterality: right       Patient Position: supine       Patient Prep/Sterile Barriers: sterile gloves, mask       Skin prep: Chloraprep       Local skin infiltrated with 2 mL of 1% lidocaine.        Needle Type: insulated and short bevel       Needle Gauge: 21.        Needle Length (millimeters): 90        - Ultrasound guided       - Ultrasound used to identify targeted nerve, plexus, vascular marker, or fascial plane and place a needle adjacent to it in real-time       - Ultrasound was used to visualize the spread of anesthetic in close proximity to the above referenced structure       - A permanent image is entered into the patient's record.    Assessment/Narrative         The placement was negative for: blood aspirated, painful injection and site bleeding       Paresthesias: No.     Bolus given via needle..        Secured via.        Insertion/Infusion Method: Single Shot       Complications: none       Injection made incrementally with aspirations every 5 mL.    Comments:  Femoral nerve block in adductor canal.  No immediate complications.  Patient tolerated well.     Ultrasound Interpretation, Peripheral Nerve Block    1. Under ultrasound guidance, the needle was inserted and placed in close proximity to the target nerve(s).  2. Ultrasound was also used to visualize the spread of the anesthetic in  close proximity to the nerve(s) being blocked.  Local anesthetic was administered in incremental doses, with intermittent negative aspiration.    3. The nerve(s) appeared anatomically normal.  4. There were no apparent abnormal pathological findings.  5. A permanent ultrasound image was saved in the patient's record.    Pt tolerated well.    No complications.      The surgeon has given a verbal order transferring care of this patient to me for the performance of a regional analgesia block for post-op pain control. It is requested of me because I am uniquely trained and qualified to perform this block and the surgeon is neither trained nor qualified to perform this procedure.

## 2021-04-16 NOTE — PROGRESS NOTES
Patient vital signs are at baseline: Yes  Patient able to ambulate as they were prior to admission or with assist devices provided by therapies during their stay:  No,  Reason:  Not oob yet  Patient MUST void prior to discharge:  No,  Reason:  No urge to urinate yet  Patient able to tolerate oral intake:  Yes  Pain has adequate pain control using Oral analgesics:  Yes

## 2021-04-16 NOTE — PROGRESS NOTES
Calvin Damon  2021  POD # 0 sp tka    Admit Date:  2021      Doing well.  No immediate surgical complications identified.  No excessive bleeding  Pain well-controlled.  Objective:  Blood pressure 124/86, pulse 92, temperature 97.8  F (36.6  C), temperature source Oral, resp. rate 16, height 1.829 m (6'), weight 141.3 kg (311 lb 8 oz), SpO2 99 %.    Temperatures:  Current - Temp: 97.8  F (36.6  C); Max - Temp  Av.5  F (36.4  C)  Min: 97.2  F (36.2  C)  Max: 97.8  F (36.6  C)  Pulse range: Pulse  Av.2  Min: 78  Max: 94  Blood pressure range: Systolic (24hrs), Av , Min:98 , Max:142   ; Diastolic (24hrs), Av, Min:60, Max:86    Exam:  CMS: intact  alert, stable, wound ok  Calf nontender b l.       Labs:  Recent Labs   Lab Test 21  0611   POTASSIUM 4.1     No results for input(s): HGB in the last 62788 hours.  No results for input(s): INR in the last 13556 hours.  No results for input(s): PLT in the last 45597 hours.    PLAN: Weight bearing as tolerated  Start physical therapy  Pain control measures  Plan discharge to home tomorrow.     0 sp tka

## 2021-04-16 NOTE — PROGRESS NOTES
04/16/21 1500   Quick Adds   Type of Visit Initial PT Evaluation   Living Environment   People in home spouse   Current Living Arrangements house   Home Accessibility stairs to enter home;stairs within home   Number of Stairs, Main Entrance 1   Stair Railings, Main Entrance none   Number of Stairs, Within Home, Primary 8   Stair Railings, Within Home, Primary railings safe and in good condition   Transportation Anticipated family or friend will provide   Living Environment Comments Patient has a FWW and SEC at home.    Self-Care   Usual Activity Tolerance good   Current Activity Tolerance moderate   Equipment Currently Used at Home walker, rolling;cane, straight   Activity/Exercise/Self-Care Comment Patient alternated between using cane and walker prior to admission due to R knee pain. Modified independent with all mobility.    Disability/Function   Hearing Difficulty or Deaf no   Wear Glasses or Blind yes   Vision Management glasses   Concentrating, Remembering or Making Decisions Difficulty no   Difficulty Communicating no   Difficulty Eating/Swallowing no   Fall history within last six months no   General Information   Onset of Illness/Injury or Date of Surgery 04/16/21   Referring Physician Denisse Yun PA-C   Patient/Family Therapy Goals Statement (PT) to go home tomorrow   Pertinent History of Current Problem (include personal factors and/or comorbidities that impact the POC) Patient is 54 YO M POD 0 s/p R TKA. PMH: obesity, CAD, DM, HTN, asthma   Existing Precautions/Restrictions fall   Weight-Bearing Status - LUE full weight-bearing   Weight-Bearing Status - RUE full weight-bearing   Weight-Bearing Status - LLE full weight-bearing   Weight-Bearing Status - RLE weight-bearing as tolerated   General Observations Activity orders: ambulate with assist   Cognition   Orientation Status (Cognition) oriented x 4   Affect/Mental Status (Cognition) WNL   Follows Commands (Cognition) WNL   Pain  Assessment   Patient Currently in Pain Yes, see Vital Sign flowsheet  (5/10 R Knee)   Integumentary/Edema   Integumentary/Edema Comments R LE wrapped in ACE bandage, no drainage noted   Posture    Posture Protracted shoulders   Range of Motion (ROM)   ROM Comment R knee AAROM 2- 63 degrees, flexion limited by pain   Strength   Manual Muscle Testing Quick Adds Able to perform R SLR   Strength Comments R knee extension 3-/5, functional weakness during transfers   Bed Mobility   Bed Mobility sit-supine   Sit-Supine [place] (Bed Mobility) supervision   Transfers   Transfers sit-stand transfer   Sit-Stand Transfer   Sit-Stand [place] (Transfers) supervision   Assistive Device (Sit-Stand Transfers) walker, front-wheeled   Sit/Stand Transfer Comments From edge of bed   Gait/Stairs (Locomotion)   [place] Level (Gait) contact guard   Assistive Device (Gait) walker, front-wheeled   Distance in Feet (Required for LE Total Joints) 12' during eval with FWW + 155' during treatment   Pattern (Gait) step-to   Deviations/Abnormal Patterns (Gait) antalgic;right sided deviations;gait speed decreased;dhara decreased   Right Sided Gait Deviations heel strike decreased   Comment (Gait/Stairs) Patient ambulated with heavy upper body support on walker, R knee in full extension   Balance   Balance Comments Good sitting balance; SBA for standing balance with FWW   Sensory Examination   Sensory Perception WNL   Coordination   Coordination no deficits were identified   Clinical Impression   Criteria for Skilled Therapeutic Intervention yes, treatment indicated   PT Diagnosis (PT) impaired mobility   Influenced by the following impairments pain, decreased R Knee ROM, muscle weakness, decresaed activity tolerance   Functional limitations due to impairments decresaed independence with bed mobility, transfers and ambulation   Clinical Presentation Stable/Uncomplicated   Clinical Presentation Rationale medical status, clinical  judgement   Clinical Decision Making (Complexity) low complexity   Therapy Frequency (PT) Daily   Predicted Duration of Therapy Intervention (days/wks) 3 days   Planned Therapy Interventions (PT) bed mobility training;gait training;home exercise program;patient/family education;stair training;strengthening;transfer training;ROM (range of motion)   Risk & Benefits of therapy have been explained evaluation/treatment results reviewed;care plan/treatment goals reviewed;risks/benefits reviewed;current/potential barriers reviewed;participants voiced agreement with care plan;participants included;patient   Clinical Impression Comments Independent SLR, pain increased from 2/10 to 5/10 with activity   PT Discharge Planning    PT Rationale for DC Rec Anticipate patient will need SBA for transfers and gait with FWW; +1 assist from wife for stairs navigation; Patient already has OP PT scheduled on Monday.    Total Evaluation Time   Total Evaluation Time (Minutes) 11

## 2021-04-16 NOTE — BRIEF OP NOTE
[facility]    Brief Operative Note    Pre-operative diagnosis: Osteoarthritis [M19.90]  Post-operative diagnosis Same as pre-operative diagnosis    Procedure: Procedure(s):  RIGHT TOTAL KNEE ARTHROPLASTY  Surgeon: Surgeon(s) and Role:     * Molly Owens MD - Primary     * Jennifer Nguyen PA-C - Assisting  Anesthesia: General   Estimated blood loss: Less than 10 ml  Drains: None  Specimens: * No specimens in log *  Complications: None.    Implants:   Implant Name Type Inv. Item Serial No.  Lot No. LRB No. Used Action   BONE CEMENT SIMPLEX 1/2 DOSE 6188-1-001 Cement, Bone BONE CEMENT SIMPLEX 1/2 DOSE 6188-1-001  [employer] [id number] Right 2 Implanted   IMP FEM BASEPLATE JJ ATTUNE POST RP SZ 6 [id number] Total Joint Component/Insert IMP FEM BASEPLATE JJ ATTUNE POST RP SZ 6 [id number]  J [id number] Right 1 Implanted   IMP PATELLA JJ ATTUNE DOME 38MM [id number] Total Joint Component/Insert IMP PATELLA JJ ATTUNE DOME 38MM [id number]  [employer]C [id number] Right 1 Implanted   IMP COMP FEM JJ ATTUNE POST STAB RT JENNIFER SZ6 [id number] Total Joint Component/Insert IMP COMP FEM JJ ATTUNE POST STAB RT JENNIFER SZ6 [id number]  [employer] [id number] Right 1 Implanted   IMP INSERT JJ ATTUNE PS RP SZ6 7MM [id number] Total Joint Component/Insert IMP INSERT JJ ATTUNE PS RP SZ6 7MM [id number]  [employer]-   [id number] Right 1 Implanted

## 2021-04-17 ENCOUNTER — APPOINTMENT (OUTPATIENT)
Dept: PHYSICAL THERAPY | Facility: CLINIC | Age: 56
End: 2021-04-17
Attending: ORTHOPAEDIC SURGERY
Payer: COMMERCIAL

## 2021-04-17 VITALS
HEART RATE: 70 BPM | WEIGHT: 311.5 LBS | DIASTOLIC BLOOD PRESSURE: 70 MMHG | OXYGEN SATURATION: 96 % | HEIGHT: 72 IN | SYSTOLIC BLOOD PRESSURE: 121 MMHG | RESPIRATION RATE: 16 BRPM | BODY MASS INDEX: 42.19 KG/M2 | TEMPERATURE: 97.7 F

## 2021-04-17 LAB
ANION GAP SERPL CALCULATED.3IONS-SCNC: 4 MMOL/L (ref 3–14)
BUN SERPL-MCNC: 23 MG/DL (ref 7–30)
CALCIUM SERPL-MCNC: 8.5 MG/DL (ref 8.5–10.1)
CHLORIDE SERPL-SCNC: 107 MMOL/L (ref 94–109)
CO2 SERPL-SCNC: 28 MMOL/L (ref 20–32)
CREAT SERPL-MCNC: 0.82 MG/DL (ref 0.66–1.25)
GFR SERPL CREATININE-BSD FRML MDRD: >90 ML/MIN/{1.73_M2}
GLUCOSE BLDC GLUCOMTR-MCNC: 118 MG/DL (ref 70–99)
GLUCOSE BLDC GLUCOMTR-MCNC: 203 MG/DL (ref 70–99)
GLUCOSE SERPL-MCNC: 122 MG/DL (ref 70–99)
HBA1C MFR BLD: 6.7 % (ref 0–5.6)
HGB BLD-MCNC: 11.9 G/DL (ref 13.3–17.7)
POTASSIUM SERPL-SCNC: 4 MMOL/L (ref 3.4–5.3)
SODIUM SERPL-SCNC: 139 MMOL/L (ref 133–144)

## 2021-04-17 PROCEDURE — 97110 THERAPEUTIC EXERCISES: CPT | Mod: GP

## 2021-04-17 PROCEDURE — 96372 THER/PROPH/DIAG INJ SC/IM: CPT | Performed by: PHYSICIAN ASSISTANT

## 2021-04-17 PROCEDURE — 999N000111 HC STATISTIC OT IP EVAL DEFER: Performed by: REHABILITATION PRACTITIONER

## 2021-04-17 PROCEDURE — 97116 GAIT TRAINING THERAPY: CPT | Mod: GP

## 2021-04-17 PROCEDURE — 99225 PR SUBSEQUENT OBSERVATION CARE,LEVEL II: CPT | Performed by: NURSE PRACTITIONER

## 2021-04-17 PROCEDURE — 80048 BASIC METABOLIC PNL TOTAL CA: CPT | Performed by: PHYSICIAN ASSISTANT

## 2021-04-17 PROCEDURE — 250N000011 HC RX IP 250 OP 636: Performed by: PHYSICIAN ASSISTANT

## 2021-04-17 PROCEDURE — 85018 HEMOGLOBIN: CPT | Performed by: PHYSICIAN ASSISTANT

## 2021-04-17 PROCEDURE — 250N000013 HC RX MED GY IP 250 OP 250 PS 637: Performed by: PHYSICIAN ASSISTANT

## 2021-04-17 PROCEDURE — 82962 GLUCOSE BLOOD TEST: CPT

## 2021-04-17 PROCEDURE — 36415 COLL VENOUS BLD VENIPUNCTURE: CPT | Performed by: PHYSICIAN ASSISTANT

## 2021-04-17 PROCEDURE — 83036 HEMOGLOBIN GLYCOSYLATED A1C: CPT | Performed by: PHYSICIAN ASSISTANT

## 2021-04-17 PROCEDURE — 99207 PR CDG-CODE CATEGORY CHANGED: CPT | Performed by: NURSE PRACTITIONER

## 2021-04-17 RX ADMIN — INSULIN ASPART 2 UNITS: 100 INJECTION, SOLUTION INTRAVENOUS; SUBCUTANEOUS at 12:35

## 2021-04-17 RX ADMIN — ASPIRIN 325 MG: 325 TABLET, COATED ORAL at 08:05

## 2021-04-17 RX ADMIN — OXYCODONE HYDROCHLORIDE 5 MG: 5 TABLET ORAL at 12:39

## 2021-04-17 RX ADMIN — ENOXAPARIN SODIUM 40 MG: 40 INJECTION SUBCUTANEOUS at 12:34

## 2021-04-17 RX ADMIN — LOSARTAN POTASSIUM 50 MG: 50 TABLET, FILM COATED ORAL at 08:05

## 2021-04-17 RX ADMIN — METOPROLOL SUCCINATE 25 MG: 25 TABLET, EXTENDED RELEASE ORAL at 08:05

## 2021-04-17 RX ADMIN — OXYCODONE HYDROCHLORIDE 5 MG: 5 TABLET ORAL at 09:01

## 2021-04-17 RX ADMIN — KETOROLAC TROMETHAMINE 15 MG: 15 INJECTION, SOLUTION INTRAMUSCULAR; INTRAVENOUS at 01:32

## 2021-04-17 RX ADMIN — CEFAZOLIN SODIUM 2 G: 2 INJECTION, SOLUTION INTRAVENOUS at 01:33

## 2021-04-17 RX ADMIN — SENNOSIDES AND DOCUSATE SODIUM 1 TABLET: 8.6; 5 TABLET ORAL at 08:05

## 2021-04-17 RX ADMIN — POLYETHYLENE GLYCOL 3350 17 G: 17 POWDER, FOR SOLUTION ORAL at 08:05

## 2021-04-17 RX ADMIN — KETOROLAC TROMETHAMINE 15 MG: 15 INJECTION, SOLUTION INTRAMUSCULAR; INTRAVENOUS at 06:20

## 2021-04-17 RX ADMIN — DOCUSATE SODIUM 100 MG: 100 CAPSULE, LIQUID FILLED ORAL at 08:05

## 2021-04-17 NOTE — PLAN OF CARE
~ Patient vital signs are at baseline, Met  ~ Patient able to ambulate as they were prior to admission or with assist devices provided by therapies during their stay. Met  ~ Patient MUST void prior to discharge, Met  ~ Patient able to tolerate oral intake to maintain hydration status, Met  ~ Patient has adequate pain control using Oral analgesics, Met  ~ Hypercapnia, hypoventilation or hypoxia resolved for at least 2 hours without supplemental oxygen, Met  ~ Deficits in sensation, mobility or coordination have resolved if spinal or regional anesthesia was used, Met  ~ Patient has returned to baseline mental status Met

## 2021-04-17 NOTE — PROGRESS NOTES
Patient vital signs are at baseline: Yes  Patient able to ambulate as they were prior to admission or with assist devices provided by therapies during their stay:  Yes  Patient MUST void prior to discharge:  Yes  Patient able to tolerate oral intake:  Yes  Pain has adequate pain control using Oral analgesics:  Yes   Progressing per plan of care. Up with SBA with GB/Walker. Is on S/S coverage. (L) LE with ryan color. Planning to discharge home tomorrow.

## 2021-04-17 NOTE — PROGRESS NOTES
[facility]    Medicine Progress Note - Hospitalist Service       Date of Admission:  4/16/2021  Assessment & Plan       Calvin is a 55-year-old man with PMH of OA, CAD, HTN, HLP, DM2, Obesity, Tobacco Use D/O, Chronic lower extremity edema, History of asthma, History of provoked DVT who underwent an elective right TKA on 4/16/2021    OA with degenerative changes of the right knee s/p right TKA on 4/16/2021    --Defer analgesic management, DVT prophylaxis, PT/OT to the primary orthopedic Service  - Encourage utilization of incentive spirometer.      CAD  HTN well controlled during this hospitalization  HLP  History of MI and s/p cardiac stenting.    - Defer resumption of ASA therapy to Ortho.                 --Currently on  mg/d for DVT prophylaxis, when he is done with this dose can resume PTA dosing of 81 mg.    - Resume PTA Lipitor 80 mg at bedtime on day of discharge.    - PTA losartan 50 mg/d and Toprol-XL 25 mg have been resumed on 4/17/2021 every morning.       Chronic lower extremity edema  Lymphedema  Managed PTA with lasix 40 mg every morning and 20 mg every afternoon, held while on IV fluids postoperatively.  - Compression stockings at home.    -Because of patient's 1 hour drive home patient family requesting to resume Lasix when they arrived at home so as not to have to stop in transit.  That is fine to start in that manner .      DM2 one hypoglycemic reading to 35 mg/dL now down to 122 mg/dL since admission  Managed PTA with glipizide XL 10 mg/d.  Last A1c per [facility] from 2018 of 6.7%.  PCP is through private clinic in [place]  - Check A1c, 6.7% 04/17/21 .    -Okay to resume PTA glipizide at discharge.      Obesity  Body mass index is 42.25 kg/m .  Increase in all-cause morbidity and mortality.   - Encourage weight loss.  - Follow up with PCP regarding ongoing management.    - Continue CPAP with home settings.       Tobacco Use D/O  Currently smoking about half pack  per day.    - Counseled at length regarding cessation to both patient and his wife.  We discussed the significant increase in risk of VTE following orthopedic surgery in the setting of prior provoked VTE on at least 2 occasions and active smoking.  Patient was counseled on potential risk of death if he does not quit smoking   - Patient may be interested in nicotine replacement therapy and will follow up with his primary provider about this.       History of asthma  Not currently on any medications.       History of provoked DVT  - Prophylaxis deferred to Ortho.       Diet: Advance Diet as Tolerated: Regular Diet Adult  Discharge Instruction - Regular Diet Adult     DVT Prophylaxis: Defer to primary service   Topete Catheter: not present  Code Status: Full Code       Total time is 18 minutes of which 12 minutes was face-to-face time remainder spent in counseling coordination of care    Disposition Plan   Expected discharge: Today, as per the orthopedic service   Entered: CAITLIN Wheeler CNP 04/17/2021, 11:35 AM       The patient's care was discussed with the Attending Physician, Dr. Pugh.    CAITLIN Wheeler CNP  Hospitalist Service  [facility]  Contact information available via [facility] Paging/Directory    ______________________________________________________________________    Interval History   Calvin King is doing well today pain is okay, he required oxycodone after physical therapy.  He denies any shortness of breath, chest pain, heaviness, tightness is been eating and drinking well passing gas no BM yet.  He does not check blood pressures at home    Data reviewed today: I reviewed all medications, new labs and imaging results over the last 24 hours. I personally reviewed no images or EKG's today.    Physical Exam   Vital Signs: Temp: 97.7  F (36.5  C) Temp src: Oral BP: 121/70 Pulse: 70   Resp: 16 SpO2: 96 % O2 Device: None (Room air)    Weight: 311 lbs 8 oz  Constitutional:  vs as per EMR  General:  adult pt lying in bed without acute distress  Neuro: +follows commands wiggle toes and show 2 fingers bilat, face symmetric, tongue midline, speech fluent  Eyes pupils equal round 3mm briskly reactive bilat, sclera nonicteric, noninjected, conjunctiva pale,  Head, ENT & mouth: NC/AT,  Dry oral mucosa  Neck: supple  CV S1S2 no murmurs  resp: CTAB upper lobes  gi:normoactive bowel sounds, soft, nontender, nondisteded  Ext: no edema  Skin: no rashes on exposed skin  Lymph: defer  Musculoskeletal no bony joint deformities, right lower extremity with Ace wrap not examined    Data   Recent Labs   Lab 04/17/21  0842 04/16/21  0611   HGB 11.9*  --      --    POTASSIUM 4.0 4.1   CHLORIDE 107  --    CO2 28  --    BUN 23  --    CR 0.82 0.88   ANIONGAP 4  --    SIDNEY 8.5  --    * 122*

## 2021-04-17 NOTE — PLAN OF CARE
Patient vital signs are at baseline: Yes  Patient able to ambulate as they were prior to admission or with assist devices provided by therapies during their stay:  Yes  Patient MUST void prior to discharge:  Yes  Patient able to tolerate oral intake:  Yes  Pain has adequate pain control using Oral analgesics:  Yes   Progressing per plan of care. Up with SBA with GB/Walker. Is on S/S coverage. (L) LE with ryan color. Planning to discharge home today.

## 2021-04-17 NOTE — PROGRESS NOTES
Pt's wife called just now that they couldn't find the oxycodone sent home with them. Nurse gave all prescribed medications to pt before discharge, 5 in total (Xarelto, oxycodone, senna, tylenol and atarax). Pt and pt's wife confirmed this before signing the green sheet confirming receipt. Told pt's wife to check inside the car and her purse. Nothing left in the room.

## 2021-04-17 NOTE — PLAN OF CARE
OT: Orders received. Chart reviewed and discussed with patient and spouse. Skilled OT not indicated due to pt with AE at home, A available at home, and no acute OT needs. Brief home safety education provided. Defer discharge recommendations to orthopedics. Will complete orders.

## 2021-04-17 NOTE — OP NOTE
Procedure Date: 04/16/2021      PREOPERATIVE DIAGNOSIS:  Osteoarthritis of the right knee and morbid obesity with a BMI of 42.      POSTOPERATIVE DIAGNOSIS:  Osteoarthritis of the right knee and morbid obesity with a BMI of 42.      PROCEDURE PERFORMED:  Complex right total knee arthroplasty using the DePuy Attune knee system, posterior stabilized rotating platform, size 6 femur, 6 tibia, 7 mm of tibial polyethylene, 38 mm patellar button.      INDICATIONS FOR PROCEDURE:  The patient is a morbidly obese 55-year-old male with bilateral osteoarthritis of the knee.  He had been getting injection treatments with some short-term relief; however, they have become decreasingly effective.  I saw the patient in consultation and discussed treatment options.  I explained to him the risks, benefits and potential complications of total knee arthroplasty.  The discussion included but was not specific to infection, vascular neurologic complications, DVT, septic and aseptic loosening, arthrofibrosis, fracture and possible need for further revision surgery.  I also discussed with the patient because of his obesity that he was at increased risk for these complications.  We also required expanders for the bed and a second scrub.      ANESTHESIA:  Spinal.      ASSISTANT:  Jennifer Nguyen PA-C.      DESCRIPTION OF PROCEDURE:  The patient was taken to the operating room and placed on the operating table in the supine position.  After adequate induction of a spinal anesthetic, a bump was placed beneath the right hip.  Pneumatic tourniquet applied to right thigh.  The patient was given 3 grams of Ancef for infection prophylaxis given 1 hour prior to incision.  We then performed sterile prep and drape of the right knee and right lower extremity.  After sterile prep and drape, the limb was exsanguinated and tourniquet was elevated to 300 mmHg.        We then made a midline incision exposing the extensor mechanism and proceeded with a medial  parapatellar arthrotomy in a quad sparing approach.  I then identified my entry point for the intramedullary guided femur, set it at 5 degrees of valgus and made my distal femoral cut.  We then sized the femur appropriately and applied the jig for 3 degrees of external rotation for the femoral component.  We then made our anterior and posterior cuts along with anterior and posterior chamfers.        We then directed our attention to the tibia, where we used an extramedullary guide to establish a neutral cut of the tibia based off the deficient medial side in order to correct the varus deformity and release it off the medial side of the tibia.  Once the tibial cut was made, we checked our flexion and extension gaps and found them to be equal.  We then finished preparation of the femur by making the box cut and finished the preparation of the tibia.  We then removed the posterior osteophytes with an osteotome and mallet and injected the posterior capsule with a mixture of ropivacaine and Toradol.  We then finished preparation of the femur by making a box cut and finished the preparation of the tibia.  We then sized the patella, made our patellar cuts and finished preparation of the patella.  While the cement was being mixed, we began preparing the cement surfaces using pulsatile lavage with antibiotic saline.  Once the cement was the appropriate consistency, we cemented first the tibial component followed by the femoral component.  Once these components were fully seated, excess cement was removed using the [place] elevator.  We then placed the knee in full extension with the trial polyethylene in place and allowed the cement to harden.  At this point, we cemented the patella, again removing the excess cement using the [place] elevator.  While the cement was hardening, we soaked the knee in a dilute solution of Betadine for 3 minutes and irrigated with pulse jet lavage, used approximately 3 liters of antibiotic saline and  pulse jet lavage.  Once the cement had hardened, we took the knee through a range of motion.  Patella tracked ideally.  We had good soft tissue balance with flexion and extension.  We then removed the trial polyethylene, inspected the joint for loose bone and cement and removed what was found.  We then irrigated again using pulse jet lavage.  We then put in the actual rotating platform tibial polyethylene, reduced the knee, took the knee through a range of motion and the patella tracked ideally.  We then placed the knee in approximately 30 degrees of flexion, irrigated with pulse jet lavage and closed the arthrotomy using 0 Ethibond sutures.  This was oversewn using 0 Stratafix.  The deep subcutaneous was closed using 0 Vicryl, superficial subcutaneous was closed with 2-0 Vicryl, skin was closed with a running 3-0 Monocryl subcuticular stitch followed by Prineo dressing.  A sterile dressing was applied followed by knee immobilizer.  The patient tolerated the operative procedure.  There were no intraoperative complications.  The patient went to PAR in stable condition.      PLAN:  Will be for the patient to get 24 hours of Ancef for infection prophylaxis and 30 days of aspirin for DVT prophylaxis.         TITO BUENO MD             D: 2021   T: 2021   MT: DESIREE      Name:     STORM VARGAS   MRN:      [id number]        Account:        [id number]   :      1965           Procedure Date: 2021      Document: [id number]

## 2021-04-22 ENCOUNTER — HOSPITAL ENCOUNTER (INPATIENT)
Facility: CLINIC | Age: 56
LOS: 3 days | Discharge: HOME | DRG: 872 | End: 2021-04-26
Attending: ORTHOPAEDIC SURGERY | Admitting: ORTHOPAEDIC SURGERY
Payer: COMMERCIAL

## 2021-04-22 ENCOUNTER — APPOINTMENT (OUTPATIENT)
Dept: GENERAL RADIOLOGY | Facility: CLINIC | Age: 56
DRG: 872 | End: 2021-04-22
Attending: PHYSICIAN ASSISTANT
Payer: COMMERCIAL

## 2021-04-22 ENCOUNTER — APPOINTMENT (OUTPATIENT)
Dept: ULTRASOUND IMAGING | Facility: CLINIC | Age: 56
DRG: 872 | End: 2021-04-22
Attending: PHYSICIAN ASSISTANT
Payer: COMMERCIAL

## 2021-04-22 DIAGNOSIS — Z96.651 STATUS POST TOTAL RIGHT KNEE REPLACEMENT: ICD-10-CM

## 2021-04-22 DIAGNOSIS — L03.115 CELLULITIS OF RIGHT LEG: Primary | ICD-10-CM

## 2021-04-22 LAB
ANION GAP SERPL CALCULATED.3IONS-SCNC: 3 MMOL/L (ref 3–14)
BASOPHILS # BLD AUTO: 0.1 10E9/L (ref 0–0.2)
BASOPHILS NFR BLD AUTO: 0.5 %
BUN SERPL-MCNC: 18 MG/DL (ref 7–30)
CALCIUM SERPL-MCNC: 8.3 MG/DL (ref 8.5–10.1)
CHLORIDE SERPL-SCNC: 100 MMOL/L (ref 94–109)
CK SERPL-CCNC: 401 U/L (ref 30–300)
CO2 SERPL-SCNC: 31 MMOL/L (ref 20–32)
CREAT SERPL-MCNC: 0.85 MG/DL (ref 0.66–1.25)
CRP SERPL-MCNC: 160 MG/L (ref 0–8)
DIFFERENTIAL METHOD BLD: ABNORMAL
EOSINOPHIL # BLD AUTO: 0.6 10E9/L (ref 0–0.7)
EOSINOPHIL NFR BLD AUTO: 5.1 %
ERYTHROCYTE [DISTWIDTH] IN BLOOD BY AUTOMATED COUNT: 13.6 % (ref 10–15)
ERYTHROCYTE [SEDIMENTATION RATE] IN BLOOD BY WESTERGREN METHOD: 97 MM/H (ref 0–20)
GFR SERPL CREATININE-BSD FRML MDRD: >90 ML/MIN/{1.73_M2}
GLUCOSE BLDC GLUCOMTR-MCNC: 122 MG/DL (ref 70–99)
GLUCOSE SERPL-MCNC: 146 MG/DL (ref 70–99)
HCT VFR BLD AUTO: 27.7 % (ref 40–53)
HGB BLD-MCNC: 9 G/DL (ref 13.3–17.7)
IMM GRANULOCYTES # BLD: 0 10E9/L (ref 0–0.4)
IMM GRANULOCYTES NFR BLD: 0.3 %
LABORATORY COMMENT REPORT: NORMAL
LACTATE BLD-SCNC: 0.9 MMOL/L (ref 0.7–2)
LYMPHOCYTES # BLD AUTO: 2.8 10E9/L (ref 0.8–5.3)
LYMPHOCYTES NFR BLD AUTO: 22.2 %
MCH RBC QN AUTO: 29 PG (ref 26.5–33)
MCHC RBC AUTO-ENTMCNC: 32.5 G/DL (ref 31.5–36.5)
MCV RBC AUTO: 89 FL (ref 78–100)
MONOCYTES # BLD AUTO: 1.4 10E9/L (ref 0–1.3)
MONOCYTES NFR BLD AUTO: 11.3 %
NEUTROPHILS # BLD AUTO: 7.5 10E9/L (ref 1.6–8.3)
NEUTROPHILS NFR BLD AUTO: 60.6 %
NRBC # BLD AUTO: 0 10*3/UL
NRBC BLD AUTO-RTO: 0 /100
NT-PROBNP SERPL-MCNC: 113 PG/ML (ref 0–900)
PLATELET # BLD AUTO: 335 10E9/L (ref 150–450)
POTASSIUM SERPL-SCNC: 3.7 MMOL/L (ref 3.4–5.3)
RBC # BLD AUTO: 3.1 10E12/L (ref 4.4–5.9)
SARS-COV-2 RNA RESP QL NAA+PROBE: NEGATIVE
SODIUM SERPL-SCNC: 134 MMOL/L (ref 133–144)
SPECIMEN SOURCE: NORMAL
WBC # BLD AUTO: 12.4 10E9/L (ref 4–11)

## 2021-04-22 PROCEDURE — 250N000013 HC RX MED GY IP 250 OP 250 PS 637: Performed by: PHYSICIAN ASSISTANT

## 2021-04-22 PROCEDURE — 82962 GLUCOSE BLOOD TEST: CPT

## 2021-04-22 PROCEDURE — 250N000011 HC RX IP 250 OP 636: Performed by: PHYSICIAN ASSISTANT

## 2021-04-22 PROCEDURE — 80048 BASIC METABOLIC PNL TOTAL CA: CPT | Performed by: PHYSICIAN ASSISTANT

## 2021-04-22 PROCEDURE — 83550 IRON BINDING TEST: CPT | Performed by: HOSPITALIST

## 2021-04-22 PROCEDURE — 83605 ASSAY OF LACTIC ACID: CPT | Performed by: PHYSICIAN ASSISTANT

## 2021-04-22 PROCEDURE — 87040 BLOOD CULTURE FOR BACTERIA: CPT | Performed by: PHYSICIAN ASSISTANT

## 2021-04-22 PROCEDURE — 73590 X-RAY EXAM OF LOWER LEG: CPT | Mod: RT

## 2021-04-22 PROCEDURE — 85652 RBC SED RATE AUTOMATED: CPT | Performed by: PHYSICIAN ASSISTANT

## 2021-04-22 PROCEDURE — 85025 COMPLETE CBC W/AUTO DIFF WBC: CPT | Performed by: PHYSICIAN ASSISTANT

## 2021-04-22 PROCEDURE — 93971 EXTREMITY STUDY: CPT | Mod: RT

## 2021-04-22 PROCEDURE — 87641 MR-STAPH DNA AMP PROBE: CPT | Performed by: PHYSICIAN ASSISTANT

## 2021-04-22 PROCEDURE — 99207 PR CONSULT E&M CHANGED TO INITIAL LEVEL: CPT | Performed by: HOSPITALIST

## 2021-04-22 PROCEDURE — 86140 C-REACTIVE PROTEIN: CPT | Performed by: PHYSICIAN ASSISTANT

## 2021-04-22 PROCEDURE — 258N000003 HC RX IP 258 OP 636

## 2021-04-22 PROCEDURE — 99223 1ST HOSP IP/OBS HIGH 75: CPT | Performed by: HOSPITALIST

## 2021-04-22 PROCEDURE — 82550 ASSAY OF CK (CPK): CPT | Performed by: PHYSICIAN ASSISTANT

## 2021-04-22 PROCEDURE — 87635 SARS-COV-2 COVID-19 AMP PRB: CPT | Performed by: PHYSICIAN ASSISTANT

## 2021-04-22 PROCEDURE — 87640 STAPH A DNA AMP PROBE: CPT | Performed by: PHYSICIAN ASSISTANT

## 2021-04-22 PROCEDURE — 36415 COLL VENOUS BLD VENIPUNCTURE: CPT | Performed by: PHYSICIAN ASSISTANT

## 2021-04-22 PROCEDURE — 83880 ASSAY OF NATRIURETIC PEPTIDE: CPT | Performed by: PHYSICIAN ASSISTANT

## 2021-04-22 PROCEDURE — 250N000011 HC RX IP 250 OP 636

## 2021-04-22 PROCEDURE — 83540 ASSAY OF IRON: CPT | Performed by: HOSPITALIST

## 2021-04-22 RX ORDER — DEXTROSE MONOHYDRATE 25 G/50ML
25-50 INJECTION, SOLUTION INTRAVENOUS
Status: DISCONTINUED | OUTPATIENT
Start: 2021-04-22 | End: 2021-04-22

## 2021-04-22 RX ORDER — GLIPIZIDE 10 MG/1
10 TABLET, FILM COATED, EXTENDED RELEASE ORAL EVERY MORNING
Status: DISCONTINUED | OUTPATIENT
Start: 2021-04-23 | End: 2021-04-26 | Stop reason: HOSPADM

## 2021-04-22 RX ORDER — MULTIPLE VITAMINS W/ MINERALS TAB 9MG-400MCG
1 TAB ORAL EVERY MORNING
Status: DISCONTINUED | OUTPATIENT
Start: 2021-04-23 | End: 2021-04-26 | Stop reason: HOSPADM

## 2021-04-22 RX ORDER — NALOXONE HYDROCHLORIDE 0.4 MG/ML
0.4 INJECTION, SOLUTION INTRAMUSCULAR; INTRAVENOUS; SUBCUTANEOUS
Status: DISCONTINUED | OUTPATIENT
Start: 2021-04-22 | End: 2021-04-26 | Stop reason: HOSPADM

## 2021-04-22 RX ORDER — NICOTINE POLACRILEX 4 MG
15-30 LOZENGE BUCCAL
Status: DISCONTINUED | OUTPATIENT
Start: 2021-04-22 | End: 2021-04-26 | Stop reason: HOSPADM

## 2021-04-22 RX ORDER — LOSARTAN POTASSIUM 50 MG/1
50 TABLET ORAL EVERY MORNING
Status: DISCONTINUED | OUTPATIENT
Start: 2021-04-23 | End: 2021-04-26 | Stop reason: HOSPADM

## 2021-04-22 RX ORDER — CEFAZOLIN SODIUM 2 G/100ML
2 INJECTION, SOLUTION INTRAVENOUS EVERY 8 HOURS
Status: DISCONTINUED | OUTPATIENT
Start: 2021-04-22 | End: 2021-04-22

## 2021-04-22 RX ORDER — NALOXONE HYDROCHLORIDE 0.4 MG/ML
0.2 INJECTION, SOLUTION INTRAMUSCULAR; INTRAVENOUS; SUBCUTANEOUS
Status: DISCONTINUED | OUTPATIENT
Start: 2021-04-22 | End: 2021-04-26 | Stop reason: HOSPADM

## 2021-04-22 RX ORDER — AMOXICILLIN 250 MG
1-2 CAPSULE ORAL 2 TIMES DAILY
Status: DISCONTINUED | OUTPATIENT
Start: 2021-04-22 | End: 2021-04-26 | Stop reason: HOSPADM

## 2021-04-22 RX ORDER — ATORVASTATIN CALCIUM 40 MG/1
80 TABLET, FILM COATED ORAL EVERY EVENING
Status: DISCONTINUED | OUTPATIENT
Start: 2021-04-22 | End: 2021-04-26 | Stop reason: HOSPADM

## 2021-04-22 RX ORDER — OXYCODONE HYDROCHLORIDE 5 MG/1
5-10 TABLET ORAL
Status: DISCONTINUED | OUTPATIENT
Start: 2021-04-22 | End: 2021-04-26 | Stop reason: HOSPADM

## 2021-04-22 RX ORDER — FUROSEMIDE 40 MG
40 TABLET ORAL EVERY MORNING
Status: DISCONTINUED | OUTPATIENT
Start: 2021-04-23 | End: 2021-04-26 | Stop reason: HOSPADM

## 2021-04-22 RX ORDER — PIPERACILLIN SODIUM, TAZOBACTAM SODIUM 3; .375 G/15ML; G/15ML
3.38 INJECTION, POWDER, LYOPHILIZED, FOR SOLUTION INTRAVENOUS EVERY 6 HOURS
Status: DISCONTINUED | OUTPATIENT
Start: 2021-04-22 | End: 2021-04-23

## 2021-04-22 RX ORDER — NITROGLYCERIN 0.4 MG/1
0.4 TABLET SUBLINGUAL DAILY PRN
Status: DISCONTINUED | OUTPATIENT
Start: 2021-04-22 | End: 2021-04-26 | Stop reason: HOSPADM

## 2021-04-22 RX ORDER — DEXTROSE MONOHYDRATE 25 G/50ML
25-50 INJECTION, SOLUTION INTRAVENOUS
Status: DISCONTINUED | OUTPATIENT
Start: 2021-04-22 | End: 2021-04-26 | Stop reason: HOSPADM

## 2021-04-22 RX ORDER — HYDROXYZINE HYDROCHLORIDE 25 MG/1
25 TABLET, FILM COATED ORAL EVERY 6 HOURS PRN
Status: DISCONTINUED | OUTPATIENT
Start: 2021-04-22 | End: 2021-04-26 | Stop reason: HOSPADM

## 2021-04-22 RX ORDER — VANCOMYCIN HYDROCHLORIDE 1 G/200ML
1000 INJECTION, SOLUTION INTRAVENOUS EVERY 24 HOURS
Status: DISCONTINUED | OUTPATIENT
Start: 2021-04-22 | End: 2021-04-22

## 2021-04-22 RX ORDER — TRAMADOL HYDROCHLORIDE 50 MG/1
50 TABLET ORAL 2 TIMES DAILY PRN
COMMUNITY

## 2021-04-22 RX ORDER — FUROSEMIDE 20 MG
20 TABLET ORAL EVERY EVENING
Status: DISCONTINUED | OUTPATIENT
Start: 2021-04-22 | End: 2021-04-26 | Stop reason: HOSPADM

## 2021-04-22 RX ORDER — METOPROLOL SUCCINATE 25 MG/1
25 TABLET, EXTENDED RELEASE ORAL EVERY MORNING
Status: DISCONTINUED | OUTPATIENT
Start: 2021-04-23 | End: 2021-04-26 | Stop reason: HOSPADM

## 2021-04-22 RX ORDER — NICOTINE POLACRILEX 4 MG
15-30 LOZENGE BUCCAL
Status: DISCONTINUED | OUTPATIENT
Start: 2021-04-22 | End: 2021-04-22

## 2021-04-22 RX ADMIN — CEFAZOLIN SODIUM 2 G: 2 INJECTION, SOLUTION INTRAVENOUS at 19:57

## 2021-04-22 RX ADMIN — RIVAROXABAN 10 MG: 10 TABLET, FILM COATED ORAL at 18:55

## 2021-04-22 RX ADMIN — OXYCODONE HYDROCHLORIDE 5 MG: 5 TABLET ORAL at 17:14

## 2021-04-22 RX ADMIN — SENNOSIDES AND DOCUSATE SODIUM 1 TABLET: 8.6; 5 TABLET ORAL at 20:17

## 2021-04-22 RX ADMIN — ATORVASTATIN CALCIUM 80 MG: 40 TABLET, FILM COATED ORAL at 20:17

## 2021-04-22 RX ADMIN — VANCOMYCIN HYDROCHLORIDE 2000 MG: 5 INJECTION, POWDER, LYOPHILIZED, FOR SOLUTION INTRAVENOUS at 22:20

## 2021-04-22 RX ADMIN — PIPERACILLIN SODIUM AND TAZOBACTAM SODIUM 3.38 G: 3; .375 INJECTION, POWDER, LYOPHILIZED, FOR SOLUTION INTRAVENOUS at 20:42

## 2021-04-22 RX ADMIN — FUROSEMIDE 20 MG: 20 TABLET ORAL at 20:17

## 2021-04-22 RX ADMIN — OXYCODONE HYDROCHLORIDE 5 MG: 5 TABLET ORAL at 17:52

## 2021-04-22 RX ADMIN — OXYCODONE HYDROCHLORIDE 10 MG: 5 TABLET ORAL at 20:21

## 2021-04-22 NOTE — CONSULTS
[facility]  Consult Note - Hospitalist Service     Date of Admission:  4/22/2021  Consult Requested by: Anshul Parker PA-C   Reason for Consult: RLE cellulitis, S/P recent TKA.     Assessment & Plan   Calvin Damon is a 55 year old male with PMHx of osteoarthritis, CAD with prior stenting to the OM1 (2017), hypertension, dyslipidemia, NIDDM, obesity, tobacco use, chronic venous insufficiency, asthma, hx of provoked DVT and s/p R TKA on 4/16/21 who was directly admitted under the Orthopedic service for evaluation of acute, severe RLE cellulitis. Hospitalist service was contacted for medical co-management.     Sepsis secondary to severe RLE cellulitis with bulla  Osteoarthritis s/p right TKA: Meeting sepsis criteria with tachycardia, WBC 12.4. US DVT LE negative.   * During my assessment, pain well managed, good cap refill in RLE, no numbness/tingling, pulses 2/4. No profound weakness noted   - Ortho is primary, appreciate input. No plan for OR at this time. Reevaluate in the AM pending response to antibiotics   - IV Zosyn and Vancomycin, MRSA swab ordered (no hx of MRSA), de-escalate pending cultures   - Blood cultures ordered, follow   - Monitor fever curve   - ID consulted, appreciate assistance   - Wound nurse consulted, appreciate assistance   - Demarcate cellulitic area   - CMS checks   - Monitor fever curve   - CBC and BMP in the AM     CAD s/p PTCA with stenting of OM1 (2017, [facility])  HTN, dyslipidemia:   - Continue PTA Lipitor 80 mg at bedtime.   - Continue PTA losartan 50 mg/d and Toprol-XL 25 mg every morning.  Hold parameters in place.  - BMP in the morning.       Chronic lower extremity edema  Lymphedema  Managed PTA with lasix 40 mg every morning and 20 mg every afternoon.  - Compression stockings at home   - Continue PTA dosing      DM2, non-insulin dependent, controlled: Managed PTA with glipizide XL 10 mg/d. A1C 4/17/21 was 6.7.   - Hold PTA glipizide, resume at  discharge.    - Medium intensity sliding scale insulin.    - Glucose checks QID and at 2AM.    - Hypoglycemic protocol.         Obesity: Body mass index is 42.25 kg/m .  Increase in all-cause morbidity and mortality.   - Encourage weight loss.  - Follow up with PCP regarding ongoing management.    - Continue CPAP with home settings.       Tobacco Use D/O: Quit cold turkey since most recent surgery. Previously smoked about half pack per day.    - Counseled regarding cessation.    - Patient declined nicotine replacement therapy        History of asthma  Not currently on any medications.       History of provoked DVT  - Maintained on Xarelto for DVT prophylaxis per Ortho      The patient's care was discussed with the Attending Physician, Dr. Cardona, Bedside Nurse and Patient.    Yoselin Conklin PA-C  [facility]  Contact information available via [facility] Paging/Directory  ______________________________________________________________________    Chief Complaint   RLE cellulitis     History is obtained from the patient and chart review.     History of Present Illness   Calvin Damon is a 55 year old male with PMHx of osteoarthritis, CAD with prior stenting to the OM1 (2017), hypertension, dyslipidemia, NIDDM, obesity, tobacco use, chronic venous insufficiency, asthma, hx of provoked DVT and s/p R TKA on 4/16/21 who was directly admitted under the Orthopedic service for evaluation of acute, severe RLE cellulitis. Hospitalist service was contacted for medical co-management.    Reports acute onset of increased swelling, RLE pain, redness and skin that is hot to touch earlier today. No trauma, cuts or known injuries. Hx of chronic venous stasis discoloration. No recent antibiotics. No fevers at home. No weakness, numbness, tingling. Noticed bulla formation today that has become increasingly purulent.     Review of Systems   The 10 point Review of Systems is negative other than  noted in the HPI.     Past Medical History    I have reviewed this patient's medical history and updated it with pertinent information if needed.   Past Medical History:   Diagnosis Date     CAD (coronary artery disease)      Diabetes (H)      H/O blood clots      Hypertension      Uncomplicated asthma      Past Surgical History   I have reviewed this patient's surgical history and updated it with pertinent information if needed.  Past Surgical History:   Procedure Laterality Date     CARDIAC SURGERY  01/2017    stent     HERNIA REPAIR      umbilical     ORTHOPEDIC SURGERY      right arthroscopic shoulder     ORTHOPEDIC SURGERY Left 2007    plate upper arm     ORTHOPEDIC SURGERY Right 2018    ankle repair     ORTHOPEDIC SURGERY Right 07/2020    arthroscopy knee     ORTHOPEDIC SURGERY Right 1997    knee arthroscopy     Social History   I have reviewed this patient's social history and updated it with pertinent information if needed.  Social History     Tobacco Use     Smoking status: Current Every Day Smoker     Packs/day: 0.25     Types: Cigarettes     Smokeless tobacco: Never Used   Substance Use Topics     Alcohol use: Never     Frequency: Never     Drug use: Not on file   Quit smoking cold turkey following Orthopedic Surgery.     Family History   Reviewed and noncontributory to current chief complaint.     Medications   Medications Prior to Admission   Medication Sig Dispense Refill Last Dose     acetaminophen (TYLENOL) 325 MG tablet Take 2 tablets (650 mg) by mouth every 4 hours as needed for other (mild pain) 100 tablet 0 4/22/2021 at 0600     Ascorbic Acid (VITAMIN C) 500 MG CAPS Take 500 mg by mouth every morning    4/22/2021 at AM     atorvastatin (LIPITOR) 80 MG tablet Take 80 mg by mouth every evening   4/21/2021 at PM     Coenzyme Q10 (COQ10) 200 MG CAPS Take 200 mg by mouth every morning    4/22/2021 at AM     furosemide (LASIX) 20 MG tablet Take 40 mg by mouth every morning (2 X 20 mg) (in addition to  PM dose)   4/22/2021 at AM     furosemide (LASIX) 20 MG tablet Take 20 mg by mouth every evening (in addition to morning dose)   4/21/2021 at PM     glipiZIDE (GLUCOTROL XL) 10 MG 24 hr tablet Take 10 mg by mouth every morning    4/22/2021 at AM     hydrOXYzine (ATARAX) 25 MG tablet Take 1 tablet (25 mg) by mouth every 6 hours as needed for itching or anxiety (with pain, moderate pain) 30 tablet 0 Past Week at Unknown time     lactobacillus rhamnosus, GG, (CULTURELL) capsule Take 1 capsule by mouth every morning    4/22/2021 at AM     losartan (COZAAR) 50 MG tablet Take 50 mg by mouth every morning    4/22/2021 at AM     metoprolol succinate ER (TOPROL-XL) 25 MG 24 hr tablet Take 25 mg by mouth every morning    4/22/2021 at AM     Multiple Vitamins-Minerals (ONE-A-DAY MENS 50+) TABS Take 1 tablet by mouth every morning    4/22/2021 at AM     rivaroxaban ANTICOAGULANT (XARELTO) 10 MG TABS tablet Take 1 tablet (10 mg) by mouth daily (with dinner) 30 tablet 0 4/21/2021 at PM     senna-docusate (SENOKOT-S/PERICOLACE) 8.6-50 MG tablet Take 1-2 tablets by mouth 2 times daily Take while on oral narcotics to prevent or treat constipation. 30 tablet 0 4/21/2021 at Unknown time     traMADol (ULTRAM) 50 MG tablet Take 50 mg by mouth 2 times daily as needed for severe pain   4/22/2021 at AM     nitroGLYcerin (NITROSTAT) 0.4 MG sublingual tablet Place 0.4 mg under the tongue daily as needed for chest pain For chest pain place 1 tablet under the tongue every 5 minutes for 3 doses. If symptoms persist 5 minutes after 1st dose call 911.   More than a month at Unknown time       Allergies   Allergies   Allergen Reactions     Metformin Diarrhea     Synvisc [Hylan G-F 20] Rash       Physical Exam   Vital Signs: Temp: 98  F (36.7  C) Temp src: Oral BP: 127/73 Pulse: 85   Resp: 16 SpO2: 97 % O2 Device: None (Room air)    Weight: 0 lbs 0 oz    CONSTITUTIONAL: Obese male laying in bed, dressed in hospital garb. Appears comfortable.  Cooperative with interview.  HEENT: Normocephalic, atraumatic.   CARDIOVASCULAR: RRR, no murmurs, rubs, or extra heart sounds appreciated. Pulses +2/4 and regular in upper and lower extremities, bilaterally. Good cap refill in bilateral lower extremities.   RESPIRATORY: No increased work of breathing. CTA, bilat; no wheezes, rales, or rhonchi appreciated.  GASTROINTESTINAL:  Abdomen soft, non-distended. BS auscultated in all four quadrants. Negative for tenderness to palpation.  No masses or organomegaly noted.  MUSCULOSKELETAL: Strength testing limited due to pain in RLE, but pt spontaneously moves RLE without difficulty. No gross deformities noted. Normal muscle tone.   HEMATOLOGIC/LYMPHATIC/IMMUNOLOGIC: 2+ TRINA on right, trace on left.   NEUROLOGIC: Alert and oriented to person, place, and time. Sensation equal and intact in upper and lower extremities, bilat. No reported numbness or tingling.   SKIN: Warm, dry, intact. No jaundice noted. Negative for suspicious lesions, rashes, bruising, open sores or abrasions.     Data   Results for orders placed or performed during the hospital encounter of 04/22/21 (from the past 24 hour(s))   US Lower Extremity Venous Duplex Right    Narrative    EXAM: US LOWER EXTREMITY VENOUS DUPLEX RIGHT  LOCATION: [facility]  DATE/TIME: 4/22/2021 5:00 PM    INDICATION: Pain and swelling. Status post knee replacement.  COMPARISON: None.  TECHNIQUE: Venous Duplex ultrasound of the right lower extremity with and without compression, augmentation and duplex. Color flow and spectral Doppler with waveform analysis performed.    FINDINGS: Exam includes the common femoral, femoral, popliteal, and contralateral common femoral veins as well as segmentally visualized deep calf veins and greater saphenous vein.     RIGHT: No deep vein thrombosis. No superficial thrombophlebitis. No popliteal cyst.      Impression    IMPRESSION:  1.  No deep venous thrombosis in the right lower  extremity. Edematous change right lower extremity   CBC with platelets differential   Result Value Ref Range    WBC 12.4 (H) 4.0 - 11.0 10e9/L    RBC Count 3.10 (L) 4.4 - 5.9 10e12/L    Hemoglobin 9.0 (L) 13.3 - 17.7 g/dL    Hematocrit 27.7 (L) 40.0 - 53.0 %    MCV 89 78 - 100 fl    MCH 29.0 26.5 - 33.0 pg    MCHC 32.5 31.5 - 36.5 g/dL    RDW 13.6 10.0 - 15.0 %    Platelet Count 335 150 - 450 10e9/L    Diff Method Automated Method     % Neutrophils 60.6 %    % Lymphocytes 22.2 %    % Monocytes 11.3 %    % Eosinophils 5.1 %    % Basophils 0.5 %    % Immature Granulocytes 0.3 %    Nucleated RBCs 0 0 /100    Absolute Neutrophil 7.5 1.6 - 8.3 10e9/L    Absolute Lymphocytes 2.8 0.8 - 5.3 10e9/L    Absolute Monocytes 1.4 (H) 0.0 - 1.3 10e9/L    Absolute Eosinophils 0.6 0.0 - 0.7 10e9/L    Absolute Basophils 0.1 0.0 - 0.2 10e9/L    Abs Immature Granulocytes 0.0 0 - 0.4 10e9/L    Absolute Nucleated RBC 0.0    Basic metabolic panel   Result Value Ref Range    Sodium 134 133 - 144 mmol/L    Potassium 3.7 3.4 - 5.3 mmol/L    Chloride 100 94 - 109 mmol/L    Carbon Dioxide 31 20 - 32 mmol/L    Anion Gap 3 3 - 14 mmol/L    Glucose 146 (H) 70 - 99 mg/dL    Urea Nitrogen 18 7 - 30 mg/dL    Creatinine 0.85 0.66 - 1.25 mg/dL    GFR Estimate >90 >60 mL/min/[1.73_m2]    GFR Estimate If Black >90 >60 mL/min/[1.73_m2]    Calcium 8.3 (L) 8.5 - 10.1 mg/dL

## 2021-04-23 PROBLEM — L03.115 CELLULITIS OF RIGHT LEG: Status: ACTIVE | Noted: 2021-04-23

## 2021-04-23 LAB
ALBUMIN SERPL-MCNC: 2.5 G/DL (ref 3.4–5)
ALP SERPL-CCNC: 75 U/L (ref 40–150)
ALT SERPL W P-5'-P-CCNC: 40 U/L (ref 0–70)
ANION GAP SERPL CALCULATED.3IONS-SCNC: 2 MMOL/L (ref 3–14)
AST SERPL W P-5'-P-CCNC: 32 U/L (ref 0–45)
BILIRUB SERPL-MCNC: 1 MG/DL (ref 0.2–1.3)
BUN SERPL-MCNC: 17 MG/DL (ref 7–30)
CALCIUM SERPL-MCNC: 8.2 MG/DL (ref 8.5–10.1)
CHLORIDE SERPL-SCNC: 102 MMOL/L (ref 94–109)
CK SERPL-CCNC: 351 U/L (ref 30–300)
CO2 SERPL-SCNC: 31 MMOL/L (ref 20–32)
CREAT SERPL-MCNC: 0.92 MG/DL (ref 0.66–1.25)
ERYTHROCYTE [DISTWIDTH] IN BLOOD BY AUTOMATED COUNT: 13.9 % (ref 10–15)
GFR SERPL CREATININE-BSD FRML MDRD: >90 ML/MIN/{1.73_M2}
GLUCOSE BLDC GLUCOMTR-MCNC: 100 MG/DL (ref 70–99)
GLUCOSE BLDC GLUCOMTR-MCNC: 112 MG/DL (ref 70–99)
GLUCOSE BLDC GLUCOMTR-MCNC: 152 MG/DL (ref 70–99)
GLUCOSE BLDC GLUCOMTR-MCNC: 164 MG/DL (ref 70–99)
GLUCOSE BLDC GLUCOMTR-MCNC: 88 MG/DL (ref 70–99)
GLUCOSE SERPL-MCNC: 87 MG/DL (ref 70–99)
HCT VFR BLD AUTO: 27.8 % (ref 40–53)
HGB BLD-MCNC: 9 G/DL (ref 13.3–17.7)
IRON SATN MFR SERPL: 12 % (ref 15–46)
IRON SERPL-MCNC: 25 UG/DL (ref 35–180)
MCH RBC QN AUTO: 29.2 PG (ref 26.5–33)
MCHC RBC AUTO-ENTMCNC: 32.4 G/DL (ref 31.5–36.5)
MCV RBC AUTO: 90 FL (ref 78–100)
MRSA DNA SPEC QL NAA+PROBE: NEGATIVE
PLATELET # BLD AUTO: 365 10E9/L (ref 150–450)
POTASSIUM SERPL-SCNC: 3.8 MMOL/L (ref 3.4–5.3)
PROCALCITONIN SERPL-MCNC: <0.05 NG/ML
PROT SERPL-MCNC: 6.2 G/DL (ref 6.8–8.8)
RBC # BLD AUTO: 3.08 10E12/L (ref 4.4–5.9)
SODIUM SERPL-SCNC: 135 MMOL/L (ref 133–144)
SPECIMEN SOURCE: NORMAL
TIBC SERPL-MCNC: 200 UG/DL (ref 240–430)
WBC # BLD AUTO: 12.3 10E9/L (ref 4–11)

## 2021-04-23 PROCEDURE — 80053 COMPREHEN METABOLIC PANEL: CPT | Performed by: HOSPITALIST

## 2021-04-23 PROCEDURE — 250N000011 HC RX IP 250 OP 636: Performed by: PHYSICIAN ASSISTANT

## 2021-04-23 PROCEDURE — 250N000013 HC RX MED GY IP 250 OP 250 PS 637: Performed by: PHYSICIAN ASSISTANT

## 2021-04-23 PROCEDURE — 85027 COMPLETE CBC AUTOMATED: CPT | Performed by: HOSPITALIST

## 2021-04-23 PROCEDURE — 84145 PROCALCITONIN (PCT): CPT | Performed by: INTERNAL MEDICINE

## 2021-04-23 PROCEDURE — 120N000001 HC R&B MED SURG/OB

## 2021-04-23 PROCEDURE — 99233 SBSQ HOSP IP/OBS HIGH 50: CPT | Performed by: INTERNAL MEDICINE

## 2021-04-23 PROCEDURE — 36415 COLL VENOUS BLD VENIPUNCTURE: CPT | Performed by: HOSPITALIST

## 2021-04-23 PROCEDURE — 250N000011 HC RX IP 250 OP 636

## 2021-04-23 PROCEDURE — 258N000003 HC RX IP 258 OP 636

## 2021-04-23 PROCEDURE — 250N000009 HC RX 250: Performed by: INTERNAL MEDICINE

## 2021-04-23 PROCEDURE — 999N001017 HC STATISTIC GLUCOSE BY METER IP

## 2021-04-23 PROCEDURE — 36415 COLL VENOUS BLD VENIPUNCTURE: CPT | Performed by: INTERNAL MEDICINE

## 2021-04-23 PROCEDURE — G0463 HOSPITAL OUTPT CLINIC VISIT: HCPCS

## 2021-04-23 PROCEDURE — 250N000013 HC RX MED GY IP 250 OP 250 PS 637: Performed by: INTERNAL MEDICINE

## 2021-04-23 PROCEDURE — 250N000011 HC RX IP 250 OP 636: Performed by: INTERNAL MEDICINE

## 2021-04-23 PROCEDURE — 82550 ASSAY OF CK (CPK): CPT | Performed by: HOSPITALIST

## 2021-04-23 RX ORDER — CLINDAMYCIN PHOSPHATE 900 MG/50ML
900 INJECTION, SOLUTION INTRAVENOUS EVERY 8 HOURS
Status: DISCONTINUED | OUTPATIENT
Start: 2021-04-23 | End: 2021-04-26

## 2021-04-23 RX ORDER — LACTOBACILLUS RHAMNOSUS GG 10B CELL
1 CAPSULE ORAL 2 TIMES DAILY
Status: DISCONTINUED | OUTPATIENT
Start: 2021-04-23 | End: 2021-04-26 | Stop reason: HOSPADM

## 2021-04-23 RX ORDER — CEFTRIAXONE 2 G/1
2 INJECTION, POWDER, FOR SOLUTION INTRAMUSCULAR; INTRAVENOUS EVERY 24 HOURS
Status: DISCONTINUED | OUTPATIENT
Start: 2021-04-23 | End: 2021-04-26

## 2021-04-23 RX ADMIN — OXYCODONE HYDROCHLORIDE 10 MG: 5 TABLET ORAL at 12:49

## 2021-04-23 RX ADMIN — CEFTRIAXONE SODIUM 2 G: 2 INJECTION, POWDER, FOR SOLUTION INTRAMUSCULAR; INTRAVENOUS at 12:53

## 2021-04-23 RX ADMIN — OXYCODONE HYDROCHLORIDE 10 MG: 5 TABLET ORAL at 16:03

## 2021-04-23 RX ADMIN — OXYCODONE HYDROCHLORIDE 10 MG: 5 TABLET ORAL at 20:06

## 2021-04-23 RX ADMIN — HYDROXYZINE HYDROCHLORIDE 25 MG: 25 TABLET, FILM COATED ORAL at 12:49

## 2021-04-23 RX ADMIN — Medication 1 CAPSULE: at 12:48

## 2021-04-23 RX ADMIN — FUROSEMIDE 40 MG: 40 TABLET ORAL at 09:10

## 2021-04-23 RX ADMIN — OXYCODONE HYDROCHLORIDE 10 MG: 5 TABLET ORAL at 09:10

## 2021-04-23 RX ADMIN — FUROSEMIDE 20 MG: 20 TABLET ORAL at 20:06

## 2021-04-23 RX ADMIN — PIPERACILLIN SODIUM AND TAZOBACTAM SODIUM 3.38 G: 3; .375 INJECTION, POWDER, LYOPHILIZED, FOR SOLUTION INTRAVENOUS at 09:11

## 2021-04-23 RX ADMIN — HYDROXYZINE HYDROCHLORIDE 25 MG: 25 TABLET, FILM COATED ORAL at 20:06

## 2021-04-23 RX ADMIN — VANCOMYCIN HYDROCHLORIDE 2000 MG: 5 INJECTION, POWDER, LYOPHILIZED, FOR SOLUTION INTRAVENOUS at 10:06

## 2021-04-23 RX ADMIN — HYDROXYZINE HYDROCHLORIDE 25 MG: 25 TABLET, FILM COATED ORAL at 06:27

## 2021-04-23 RX ADMIN — CLINDAMYCIN PHOSPHATE 900 MG: 900 INJECTION, SOLUTION INTRAVENOUS at 13:32

## 2021-04-23 RX ADMIN — RIVAROXABAN 10 MG: 10 TABLET, FILM COATED ORAL at 18:10

## 2021-04-23 RX ADMIN — ATORVASTATIN CALCIUM 80 MG: 40 TABLET, FILM COATED ORAL at 20:06

## 2021-04-23 RX ADMIN — SENNOSIDES AND DOCUSATE SODIUM 1 TABLET: 8.6; 5 TABLET ORAL at 09:10

## 2021-04-23 RX ADMIN — OXYCODONE HYDROCHLORIDE 10 MG: 5 TABLET ORAL at 06:27

## 2021-04-23 RX ADMIN — OXYCODONE HYDROCHLORIDE 10 MG: 5 TABLET ORAL at 03:15

## 2021-04-23 RX ADMIN — PIPERACILLIN SODIUM AND TAZOBACTAM SODIUM 3.38 G: 3; .375 INJECTION, POWDER, LYOPHILIZED, FOR SOLUTION INTRAVENOUS at 02:01

## 2021-04-23 RX ADMIN — LOSARTAN POTASSIUM 50 MG: 50 TABLET, FILM COATED ORAL at 09:10

## 2021-04-23 RX ADMIN — OXYCODONE HYDROCHLORIDE 10 MG: 5 TABLET ORAL at 00:04

## 2021-04-23 RX ADMIN — CLINDAMYCIN PHOSPHATE 900 MG: 900 INJECTION, SOLUTION INTRAVENOUS at 20:06

## 2021-04-23 RX ADMIN — MULTIPLE VITAMINS W/ MINERALS TAB 1 TABLET: TAB at 09:10

## 2021-04-23 RX ADMIN — SENNOSIDES AND DOCUSATE SODIUM 1 TABLET: 8.6; 5 TABLET ORAL at 20:06

## 2021-04-23 RX ADMIN — METOPROLOL SUCCINATE 25 MG: 25 TABLET, EXTENDED RELEASE ORAL at 09:10

## 2021-04-23 RX ADMIN — Medication 1 CAPSULE: at 20:06

## 2021-04-23 ASSESSMENT — ACTIVITIES OF DAILY LIVING (ADL)
ADLS_ACUITY_SCORE: 19
ADLS_ACUITY_SCORE: 17
ADLS_ACUITY_SCORE: 19

## 2021-04-23 NOTE — PROGRESS NOTES
FOLLOW-UP: , , . XR reviewed as below.   -- Continue with plan as outlined in my consult note.   -- Please refer to admission images below regarding pt's cellulitis.     Recent Results (from the past 24 hour(s))   US Lower Extremity Venous Duplex Right    Narrative    EXAM: US LOWER EXTREMITY VENOUS DUPLEX RIGHT  LOCATION: [facility]  DATE/TIME: 4/22/2021 5:00 PM    INDICATION: Pain and swelling. Status post knee replacement.  COMPARISON: None.  TECHNIQUE: Venous Duplex ultrasound of the right lower extremity with and without compression, augmentation and duplex. Color flow and spectral Doppler with waveform analysis performed.    FINDINGS: Exam includes the common femoral, femoral, popliteal, and contralateral common femoral veins as well as segmentally visualized deep calf veins and greater saphenous vein.     RIGHT: No deep vein thrombosis. No superficial thrombophlebitis. No popliteal cyst.      Impression    IMPRESSION:  1.  No deep venous thrombosis in the right lower extremity. Edematous change right lower extremity   XR Tibia & Fibula Right 2 Views    Narrative    EXAM: XR TIBIA and FIBULA RT 2 VW  LOCATION: [facility]  DATE/TIME: 4/22/2021 9:10 PM    INDICATION: Cellulitis  COMPARISON: None.      Impression    IMPRESSION: Postoperative changes of right total knee arthroplasty. Components appear well seated although they are incompletely visualized on this examination. The right tibia and fibula are negative for fracture. There is edema or cellulitis seen about   the lower leg. There are few tiny metallic density foreign bodies within the subcutaneous soft tissues along the lateral aspect of the upper calf. This could be related to old trauma. These are of doubtful clinical significance and do not appear to   represent the nidus of inflammation, edema, or cellulitis. Plantar and Achilles calcaneal spurring.

## 2021-04-23 NOTE — CONSULTS
[facility]    Infectious Disease Consultation     Date of Admission:  4/22/2021  Date of Consult (When I saw the patient): 04/23/21    Assessment & Plan   Calvin Damon is a 55 year old male who was admitted on 4/22/2021.     Impression:  1. 55 y.o male with PMHx of osteoarthritis  2. CAD with prior stenting.   3. Hypertension, dyslipidemia  4. NIDDM  5. Obesity  6. Tobacco use  7. Chronic venous insufficiency  8. Asthma  9. Hx of provoked DVT   10. S/p R TKA on 4/16/21 who was directly admitted for evaluation of acute, severe RLE cellulitis.   11. On vancomycin and zosyn.   12. MRSA PCR is negative.     Recommendations:   Very streptococcal appearing cellulitis with impressive blistering.   MRSA PCR Is negative so stop vancomycin   Stop zosyn and start ceftriaxone + clindamycin.   Anticipate at least 48 - 72 hours of IV antibiotics   Follow up on the blood cultures   The knee it self appears to be ok ortho also evaluating though patient feels redness was all the way up to the knee       Rosario Oh MD    Reason for Consult   Reason for consult: I was asked to evaluate this patient for right LE cellulitis     Primary Care Physician   Uma Pereyra    Chief Complaint   Right LE cellulitis     History is obtained from the patient and medical records    History of Present Illness   Calvin Damon is a 55 year old male with PMHx of osteoarthritis, CAD with prior stenting to the OM1 (2017), hypertension, dyslipidemia, NIDDM, obesity, tobacco use, chronic venous insufficiency, asthma, hx of provoked DVT and s/p R TKA on 4/16/21 who was directly admitted for evaluation of acute, severe RLE cellulitis    Past Medical History   I have reviewed this patient's medical history and updated it with pertinent information if needed.   Past Medical History:   Diagnosis Date     CAD (coronary artery disease)      Diabetes (H)      H/O blood clots      Hypertension      Uncomplicated asthma         Past Surgical History   I have reviewed this patient's surgical history and updated it with pertinent information if needed.  Past Surgical History:   Procedure Laterality Date     CARDIAC SURGERY  01/2017    stent     HERNIA REPAIR      umbilical     ORTHOPEDIC SURGERY      right arthroscopic shoulder     ORTHOPEDIC SURGERY Left 2007    plate upper arm     ORTHOPEDIC SURGERY Right 2018    ankle repair     ORTHOPEDIC SURGERY Right 07/2020    arthroscopy knee     ORTHOPEDIC SURGERY Right 1997    knee arthroscopy       Prior to Admission Medications   Prior to Admission Medications   Prescriptions Last Dose Informant Patient Reported? Taking?   Ascorbic Acid (VITAMIN C) 500 MG CAPS 4/22/2021 at AM Self Yes Yes   Sig: Take 500 mg by mouth every morning    Coenzyme Q10 (COQ10) 200 MG CAPS 4/22/2021 at AM Self Yes Yes   Sig: Take 200 mg by mouth every morning    Multiple Vitamins-Minerals (ONE-A-DAY MENS 50+) TABS 4/22/2021 at AM Self Yes Yes   Sig: Take 1 tablet by mouth every morning    acetaminophen (TYLENOL) 325 MG tablet 4/22/2021 at 0600 Self No Yes   Sig: Take 2 tablets (650 mg) by mouth every 4 hours as needed for other (mild pain)   atorvastatin (LIPITOR) 80 MG tablet 4/21/2021 at PM Self Yes Yes   Sig: Take 80 mg by mouth every evening   furosemide (LASIX) 20 MG tablet 4/22/2021 at AM Self Yes Yes   Sig: Take 40 mg by mouth every morning (2 X 20 mg) (in addition to PM dose)   furosemide (LASIX) 20 MG tablet 4/21/2021 at PM Self Yes Yes   Sig: Take 20 mg by mouth every evening (in addition to morning dose)   glipiZIDE (GLUCOTROL XL) 10 MG 24 hr tablet 4/22/2021 at AM Self Yes Yes   Sig: Take 10 mg by mouth every morning    hydrOXYzine (ATARAX) 25 MG tablet Past Week at Unknown time Self No Yes   Sig: Take 1 tablet (25 mg) by mouth every 6 hours as needed for itching or anxiety (with pain, moderate pain)   lactobacillus rhamnosus, GG, (CULTURELL) capsule 4/22/2021 at AM Self Yes Yes   Sig: Take 1 capsule by  mouth every morning    losartan (COZAAR) 50 MG tablet 4/22/2021 at AM Self Yes Yes   Sig: Take 50 mg by mouth every morning    metoprolol succinate ER (TOPROL-XL) 25 MG 24 hr tablet 4/22/2021 at AM Self Yes Yes   Sig: Take 25 mg by mouth every morning    nitroGLYcerin (NITROSTAT) 0.4 MG sublingual tablet More than a month at Unknown time Self Yes No   Sig: Place 0.4 mg under the tongue daily as needed for chest pain For chest pain place 1 tablet under the tongue every 5 minutes for 3 doses. If symptoms persist 5 minutes after 1st dose call 911.   rivaroxaban ANTICOAGULANT (XARELTO) 10 MG TABS tablet 4/21/2021 at PM Self No Yes   Sig: Take 1 tablet (10 mg) by mouth daily (with dinner)   senna-docusate (SENOKOT-S/PERICOLACE) 8.6-50 MG tablet 4/21/2021 at Unknown time Self No Yes   Sig: Take 1-2 tablets by mouth 2 times daily Take while on oral narcotics to prevent or treat constipation.   traMADol (ULTRAM) 50 MG tablet 4/22/2021 at AM Self Yes Yes   Sig: Take 50 mg by mouth 2 times daily as needed for severe pain      Facility-Administered Medications: None     Allergies   Allergies   Allergen Reactions     Metformin Diarrhea     Synvisc [Hylan G-F 20] Rash       Immunization History     There is no immunization history on file for this patient.    Social History   I have reviewed this patient's social history and updated it with pertinent information if needed. Calvin Damon  reports that he has been smoking cigarettes. He has been smoking about 0.25 packs per day. He has never used smokeless tobacco. He reports that he does not drink alcohol.    Family History   I have reviewed this patient's family history and updated it with pertinent information if needed.   No family history on file.    Review of Systems   The 10 point Review of Systems is negative other than noted in the HPI or here.     Physical Exam   Temp: 99.5  F (37.5  C) Temp src: Oral BP: 123/57 Pulse: 86   Resp: 16 SpO2: 92 % O2 Device: None (Room  air)    Vital Signs with Ranges  Temp:  [98  F (36.7  C)-99.5  F (37.5  C)] 99.5  F (37.5  C)  Pulse:  [] 86  Resp:  [16-18] 16  BP: (109-129)/(57-73) 123/57  SpO2:  [92 %-98 %] 92 %  0 lbs 0 oz  There is no height or weight on file to calculate BMI.    GENERAL APPEARANCE:  awake  EYES: Eyes grossly normal to inspection, PERRL and conjunctivae and sclerae normal  HENT: ear canals and TM's normal and nose and mouth without ulcers or lesions  NECK: no adenopathy, no asymmetry, masses, or scars and thyroid normal to palpation  RESP: lungs clear to auscultation - no rales, rhonchi or wheezes  CV: regular rates and rhythm, normal S1 S2, no S3 or S4 and no murmur, click or rub  LYMPHATICS: normal ant/post cervical and supraclavicular nodes  ABDOMEN: soft, nontender, without hepatosplenomegaly or masses and bowel sounds normal  MS: right LE cellulitis with blistering and left knee TKA incision   SKIN: no suspicious lesions or rashes      Data   Lab Results   Component Value Date    WBC 12.3 (H) 04/23/2021    HGB 9.0 (L) 04/23/2021    HCT 27.8 (L) 04/23/2021     04/23/2021     04/23/2021    POTASSIUM 3.8 04/23/2021    CHLORIDE 102 04/23/2021    CO2 31 04/23/2021    BUN 17 04/23/2021    CR 0.92 04/23/2021    GLC 87 04/23/2021    SED 97 (H) 04/22/2021    NTBNPI 113 04/22/2021    AST 32 04/23/2021    ALT 40 04/23/2021    ALKPHOS 75 04/23/2021    BILITOTAL 1.0 04/23/2021     No results for input(s): CULT in the last 168 hours.  No lab results found.    Invalid input(s): UC

## 2021-04-23 NOTE — PLAN OF CARE
A&O x 4, VSS on RA, pain controlled with oral analgesics, drsg with drainage, up with assist of 1 SB , voiding adequately bathroom, tolerating IV ABX, continue to monitor.

## 2021-04-23 NOTE — PHARMACY-ADMISSION MEDICATION HISTORY
Pharmacy Medication History  Admission medication history interview status for the 4/22/2021  admission is complete. See EPIC admission navigator for prior to admission medications     Location of Interview: Phone  Medication history sources: Patient and Surescripts    In the past week, patient estimated taking medication this percent of the time: greater than 90%    Medication reconciliation completed by provider prior to medication history? Yes    Time spent in this activity: 20 minutes    Prior to Admission medications    Medication Sig Last Dose Taking? Auth Provider   acetaminophen (TYLENOL) 325 MG tablet Take 2 tablets (650 mg) by mouth every 4 hours as needed for other (mild pain) 4/22/2021 at 0600 Yes Denisse Yun PA-C   Ascorbic Acid (VITAMIN C) 500 MG CAPS Take 500 mg by mouth every morning  4/22/2021 at AM Yes Reported, Patient   atorvastatin (LIPITOR) 80 MG tablet Take 80 mg by mouth every evening 4/21/2021 at PM Yes Reported, Patient   Coenzyme Q10 (COQ10) 200 MG CAPS Take 200 mg by mouth every morning  4/22/2021 at AM Yes Reported, Patient   furosemide (LASIX) 20 MG tablet Take 40 mg by mouth every morning (2 X 20 mg) (in addition to PM dose) 4/22/2021 at AM Yes Reported, Patient   furosemide (LASIX) 20 MG tablet Take 20 mg by mouth every evening (in addition to morning dose) 4/21/2021 at PM Yes Reported, Patient   glipiZIDE (GLUCOTROL XL) 10 MG 24 hr tablet Take 10 mg by mouth every morning  4/22/2021 at AM Yes Reported, Patient   hydrOXYzine (ATARAX) 25 MG tablet Take 1 tablet (25 mg) by mouth every 6 hours as needed for itching or anxiety (with pain, moderate pain) Past Week at Unknown time Yes Denisse Yun PA-C   lactobacillus rhamnosus, GG, (CULTURELL) capsule Take 1 capsule by mouth every morning  4/22/2021 at AM Yes Reported, Patient   losartan (COZAAR) 50 MG tablet Take 50 mg by mouth every morning  4/22/2021 at AM Yes Reported, Patient   metoprolol succinate ER  (TOPROL-XL) 25 MG 24 hr tablet Take 25 mg by mouth every morning  4/22/2021 at AM Yes Reported, Patient   Multiple Vitamins-Minerals (ONE-A-DAY MENS 50+) TABS Take 1 tablet by mouth every morning  4/22/2021 at AM Yes Reported, Patient   rivaroxaban ANTICOAGULANT (XARELTO) 10 MG TABS tablet Take 1 tablet (10 mg) by mouth daily (with dinner) 4/21/2021 at PM Yes Denisse Yun PA-C   senna-docusate (SENOKOT-S/PERICOLACE) 8.6-50 MG tablet Take 1-2 tablets by mouth 2 times daily Take while on oral narcotics to prevent or treat constipation. 4/21/2021 at Unknown time Yes Denisse Ynu PA-C   traMADol (ULTRAM) 50 MG tablet Take 50 mg by mouth 2 times daily as needed for severe pain 4/22/2021 at AM Yes Unknown, Entered By History   nitroGLYcerin (NITROSTAT) 0.4 MG sublingual tablet Place 0.4 mg under the tongue daily as needed for chest pain For chest pain place 1 tablet under the tongue every 5 minutes for 3 doses. If symptoms persist 5 minutes after 1st dose call 911. More than a month at Unknown time  Reported, Patient       The information provided in this note is only as accurate as the sources available at the time of update(s)

## 2021-04-23 NOTE — PHARMACY-VANCOMYCIN DOSING SERVICE
Pharmacy Vancomycin Initial Note  Date of Service 2021  Patient's  1965  55 year old, male    Indication: Skin and Soft Tissue Infection    Current estimated CrCl = Estimated Creatinine Clearance: 139.8 mL/min (based on SCr of 0.85 mg/dL).    Creatinine for last 3 days  2021:  7:27 PM Creatinine 0.85 mg/dL    Recent Vancomycin Level(s) for last 3 days  No results found for requested labs within last 72 hours.      Vancomycin IV Administrations (past 72 hours)      No vancomycin orders with administrations in past 72 hours.                Nephrotoxins and other renal medications (From now, onward)    Start     Dose/Rate Route Frequency Ordered Stop    21 0900  furosemide (LASIX) tablet 40 mg      40 mg Oral EVERY MORNING 21 16321 2200  vancomycin 2000 mg in 0.9% NaCl 500 ml intermittent infusion 2,000 mg      2,000 mg  over 2 Hours Intravenous EVERY 12 HOURS 21  piperacillin-tazobactam (ZOSYN) 3.375 g vial to attach to  mL bag      3.375 g  over 30 Minutes Intravenous EVERY 6 HOURS 21  furosemide (LASIX) tablet 20 mg      20 mg Oral EVERY EVENING 21 1636            Contrast Orders - past 72 hours (72h ago, onward)    None                Plan:  1. Start vancomycin  2000 mg IV q12h.   2. Vancomycin monitoring method: Trough (Method 2 = manual dose calculation)  3. Vancomycin therapeutic monitoring goal: 10-15 mg/L  4. Pharmacy will check vancomycin levels as appropriate in 1-3 Days.    5. Serum creatinine levels will be ordered daily for the first week of therapy and at least twice weekly for subsequent weeks.      Etta Gabriel, HemantD, BCPS

## 2021-04-23 NOTE — UTILIZATION REVIEW
Admission Status; Secondary Review Determination     Under the authority of the Utilization Management Commitee, the utilization review process indicated a secondary review on the above patient. The review outcome is based on review of the medical records, discussions with staff, and applying clinical experience noted on the date of the review.     (x) Inpatient Status Appropriate - This patient's medical care is consistent with medical management for inpatient care and reasonable inpatient medical practice.     RATIONALE FOR DETERMINATION:  55 year old male who is a direct admit from orthopedic clinic for severe right lower extremity cellulitis following recent total knee arthroplasty on 4/16/2021.  No fever and hemodynamically stable.  Labs notable for leukocytosis (12) and elevated CRP (160).  The patient is receiving IV vancomycin and IV zosyn.  Cultures are pending.  Orthopedics is following and considering operative intervention if no clinical improvement.  Given severity of infection and risk of joint infection in the setting of recent joint surgery inpatient status is appropriate.      At the time of admission with the information available to the attending physician more than 2 nights Hospital complex care was anticipated, based on patient risk of adverse outcome if treated as outpatient and complex care required. Inpatient admission is appropriate based on the Medicare guidelines.    The information on this document is developed by the utilization review team in order for the business office to ensure compliance. This only denotes the appropriateness of proper admission status and does not reflect the quality of care rendered.   The definitions of Inpatient Status and Observation Status used in making the determination above are those provided in the CMS Coverage Manual, Chapter 1 and Chapter 6, section 70.4.     Sincerely,     Ap Tejeda MD  Utilization Review   Physician Advisor   [facility]  Services

## 2021-04-23 NOTE — PROGRESS NOTES
[facility]    Hospitalist Progress Note    Date of Service (when I saw the patient): 04/23/2021    Assessment & Plan   Calvin Damon is a 55 year old male who was admitted on 4/22/2021.    Assessment & Plan     Calvin Damon is a 55 year old male with PMHx of osteoarthritis, CAD with prior stenting to the OM1 (2017), hypertension, dyslipidemia, NIDDM, obesity, tobacco use, chronic venous insufficiency, asthma, hx of provoked DVT and s/p R TKA on 4/16/21 who was directly admitted under the Orthopedic service for evaluation of acute, severe RLE cellulitis. Hospitalist service was contacted for medical co-management.     Sepsis secondary to severe RLE cellulitis with bulla  Osteoarthritis s/p right TKA: Meeting sepsis criteria with tachycardia, WBC 12.4. US DVT LE negative.   * During my assessment, pain well managed, good cap refill in RLE, no numbness/tingling, pulses 2/4. No profound weakness noted   - Ortho is primary, appreciate input. No plan for OR at this time. Reevaluate in the AM pending response to antibiotics   - IV Zosyn and Vancomycin, MRSA swab returned negative - Blood cultures ordered, follow   - Monitor fever curve   - ID consulted, appreciate assistance   - Wound nurse consulted, appreciate assistance   - Demarcate cellulitic area   RLE yellow bulla ruptured with some oozing   Lymphedema consult requested to see if we can wrap the legs to help with swelling   Elevate the RLE on 3 pillows to help with swelling  Wbc count 12.4-12.3 today  RLE doppler negative for DVT       CAD s/p PTCA with stenting of OM1 (2017, [facility])  HTN, dyslipidemia:   - Continue PTA Lipitor 80 mg at bedtime.   - Continue PTA losartan 50 mg/d and Toprol-XL 25 mg every morning.  Hold parameters in place.       Chronic lower extremity edema  Lymphedema  Managed PTA with lasix 40 mg every morning and 20 mg every afternoon.  - Compression stockings at home   - Continue PTA dosing    lymphedema  consult placed     DM2, non-insulin dependent, controlled: Managed PTA with glipizide XL 10 mg/d. A1C 4/17/21 was 6.7.   - Hold PTA glipizide, resume at discharge.    - Medium intensity sliding scale insulin.    - Glucose checks QID and at 2AM.    - Hypoglycemic protocol.         Obesity: Body mass index is 42.25 kg/m .  Increase in all-cause morbidity and mortality.   - Encourage weight loss.  - Follow up with PCP regarding ongoing management.    - Continue CPAP with home settings.       Tobacco Use D/O: Quit cold turkey since most recent surgery. Previously smoked about half pack per day.    - Counseled regarding cessation.    - Patient declined nicotine replacement therapy        History of asthma  Not currently on any medications.       History of provoked DVT  - Maintained on Xarelto for DVT prophylaxis per Ortho      DVT Prophylaxis: xarelto   Code Status: Full Code    Disposition: Expected discharge in 3-4days if remins stable and cellulitis improvement     Greater than 35 minutes were spent in reviewing the chart, counseling the patient in collaboration of care    Discussed with bedside RN patient, patient, wound care nurse today    Simi Key MD  [phone] (P)      Interval History   Patient is resting comfortably in bed.  The pain is well controlled currently.  Seen by orthopedic surgery and are following appreciate assistance.  Right lower extremity slightly less erythematous today.  The bulla on right lower extremity ruptured and is losing yellow material currently no acute events since admission.  Right lower extremity Doppler ultrasound was negative for DVT    -Data reviewed today: I reviewed all new labs and imaging results over the last 24 hours. I personally reviewed no images or EKG's today.    Physical Exam   Temp: 99.5  F (37.5  C) Temp src: Oral BP: 123/57 Pulse: 86   Resp: 16 SpO2: 92 % O2 Device: None (Room air)    There were no vitals filed for this visit.  Vital Signs with  Ranges  Temp:  [98  F (36.7  C)-99.5  F (37.5  C)] 99.5  F (37.5  C)  Pulse:  [] 86  Resp:  [16-18] 16  BP: (109-129)/(57-73) 123/57  SpO2:  [92 %-98 %] 92 %  No intake/output data recorded.    Constitutional: Awake, alert, cooperative, no apparent distress  Respiratory: Clear to auscultation bilaterally, no crackles or wheezing  Cardiovascular: Regular rate and rhythm, normal S1 and S2, and no murmur noted  GI: Normal bowel sounds, soft, non-distended, non-tender  Skin/Integumen: No rashes, no cyanosis, RIght knee tKA incison looks good. RLE cellulitis with erythema  And edema and one big bulla ruptured now     Medications       atorvastatin  80 mg Oral QPM     cefTRIAXone  2 g Intravenous Q24H     clindamycin  900 mg Intravenous Q8H     furosemide  20 mg Oral QPM     furosemide  40 mg Oral QAM     [Held by provider] glipiZIDE  10 mg Oral QAM     insulin aspart  1-7 Units Subcutaneous TID AC     insulin aspart  1-5 Units Subcutaneous At Bedtime     lactobacillus rhamnosus (GG)  1 capsule Oral BID     losartan  50 mg Oral QAM     metoprolol succinate ER  25 mg Oral QAM     multivitamin w/minerals  1 tablet Oral QAM     rivaroxaban ANTICOAGULANT  10 mg Oral Daily with supper     senna-docusate  1-2 tablet Oral BID       Data   Recent Labs   Lab 04/23/21  0535 04/22/21  1927 04/17/21  0842   WBC 12.3* 12.4*  --    HGB 9.0* 9.0* 11.9*   MCV 90 89  --     335  --     134 139   POTASSIUM 3.8 3.7 4.0   CHLORIDE 102 100 107   CO2 31 31 28   BUN 17 18 23   CR 0.92 0.85 0.82   ANIONGAP 2* 3 4   SIDNEY 8.2* 8.3* 8.5   GLC 87 146* 122*   ALBUMIN 2.5*  --   --    PROTTOTAL 6.2*  --   --    BILITOTAL 1.0  --   --    ALKPHOS 75  --   --    ALT 40  --   --    AST 32  --   --        Recent Results (from the past 24 hour(s))   US Lower Extremity Venous Duplex Right    Narrative    EXAM: US LOWER EXTREMITY VENOUS DUPLEX RIGHT  LOCATION: [facility]  DATE/TIME: 4/22/2021 5:00 PM    INDICATION: Pain and  swelling. Status post knee replacement.  COMPARISON: None.  TECHNIQUE: Venous Duplex ultrasound of the right lower extremity with and without compression, augmentation and duplex. Color flow and spectral Doppler with waveform analysis performed.    FINDINGS: Exam includes the common femoral, femoral, popliteal, and contralateral common femoral veins as well as segmentally visualized deep calf veins and greater saphenous vein.     RIGHT: No deep vein thrombosis. No superficial thrombophlebitis. No popliteal cyst.      Impression    IMPRESSION:  1.  No deep venous thrombosis in the right lower extremity. Edematous change right lower extremity   XR Tibia & Fibula Right 2 Views    Narrative    EXAM: XR TIBIA and FIBULA RT 2 VW  LOCATION: [facility]  DATE/TIME: 4/22/2021 9:10 PM    INDICATION: Cellulitis  COMPARISON: None.      Impression    IMPRESSION: Postoperative changes of right total knee arthroplasty. Components appear well seated although they are incompletely visualized on this examination. The right tibia and fibula are negative for fracture. There is edema or cellulitis seen about   the lower leg. There are few tiny metallic density foreign bodies within the subcutaneous soft tissues along the lateral aspect of the upper calf. This could be related to old trauma. These are of doubtful clinical significance and do not appear to   represent the nidus of inflammation, edema, or cellulitis. Plantar and Achilles calcaneal spurring.

## 2021-04-23 NOTE — PROGRESS NOTES
Calvin Damon  2021  POD # 7 sp tka   Admit Date:  2021      No immediate surgical complications identified.  Objective:  Blood pressure 121/72, pulse 95, temperature 98.2  F (36.8  C), temperature source Oral, resp. rate 18, SpO2 95 %.    Temperatures:  Current - Temp: 98.2  F (36.8  C); Max - Temp  Av.6  F (37  C)  Min: 98  F (36.7  C)  Max: 99.5  F (37.5  C)  Pulse range: Pulse  Av.8  Min: 85  Max: 112  Blood pressure range: Systolic (24hrs), Av , Min:121 , Max:129   ; Diastolic (24hrs), Av, Min:64, Max:73    Exam:  CMS: intact  alert, stable, wound ok  Cellulitis improved      Labs:  Recent Labs   Lab Test 21  0535 21  0842   POTASSIUM 3.8 3.7 4.0     Recent Labs   Lab Test 21  0535 21  0842   HGB 9.0* 9.0* 11.9*     No results for input(s): INR in the last 02258 hours.  Recent Labs   Lab Test 21  0535 21    335       PLAN: Touch down weight bearing  Continue IV antibiotics.

## 2021-04-23 NOTE — PROGRESS NOTES
Alert and oriented x4. VSS. NPO maintained for possible surgery. No increase noted to erythema and blister on RLE. Yellow drainage noted from blister.  Continues to c/o pain on RLE, rating pain 8/10. PRN Oxycodone given q3H, Atarax given x 1. Ambulated with assist of one to toilet, voiding well. Last BG at 0600 is 88 mg/dl.

## 2021-04-23 NOTE — PLAN OF CARE
Pt A&O4, VSS on RA. Regular diet- pt will be NPO at midnight for procedure. Up SBA. 2-3+ edema bilat LE. Redness/ryan seems to be getting darker colored since pt arrived. Baseball sized Blister on R outer shin- small amount of yellow/clear drainage. C/o 8/10 R leg pain managed with PRN oxycodone given 1x.  U/S RLE (-) DVT. ID/ortho following.

## 2021-04-23 NOTE — CONSULTS
[facility]  WOC Nurse Inpatient Wound Assessment     Reason for consultation: Evaluate and treat right lower leg blister      Assessment  RLE: acute cellulitis with large gelatinous weepy blister; pt is s/p recent Rt TKA 4-16-21.  There is currently only one blistered area, which is opened and weeping copious yellow clear drainage.  The semi-firm blister material is fairly loose but does not easily debride with cleansing yet.  There is diffuse deep pink-purple discoloration to lower leg including medial foot.  RLE is very swollen, heavy, taut, and moderately tender.  Recommend starting EdemaWear once tolerated, to help mobilize the distal lymphatics and microvessels, which may also promote perfusion of the antibiotics to the affected tissue.  As the blistered area opens up, would dress with Vashe-moist gauze as a topical antimicrobial.  Monitor need for surgical debridement of devitalized tissue.      Treatment Plan  RLE: BID and prn:  1.  Apply Vashe-moist gauze to any open/ blistered areas  2.  Secure with EdemaWear as tolerated (ideally size Medium #[id number], but could start with Large #[id number])  3.  ABD pads and Kerlix on top of the EdemaWear as needed   4.  Elevate legs as much as possible; float heels    - also apply EdemaWear (medium) to LLE  - defer to Lymphedema if/when they start wrapping pt  - ok to apply EdemaWear under lymph wraps  - hand wash the EW when soiled, hang dry, do NOT throw away    Orders Written  Recommended provider order: monitor need for surgical consult, ie Vascular Surgery for any debridement  WOC Nurse follow-up plan: 1-2x week and prn  Nursing to notify the Provider(s) and re-consult the WOC Nurse if wound(s) deteriorates or new skin concern.    Patient History  According to provider note(s):  Calvin Damon is a 55 year old male with PMHx of osteoarthritis, CAD with prior stenting to the OM1 (2017), hypertension, dyslipidemia, NIDDM, obesity, tobacco use,  chronic venous insufficiency, asthma, hx of provoked DVT and s/p R TKA on 4/16/21 who was directly admitted under the Orthopedic service for evaluation of acute, severe RLE cellulitis.      Objective Data  Containment of urine/stool: continent    Active Diet Order:  Orders Placed This Encounter      Moderate Consistent CHO Diet    Output:   No intake/output data recorded.    Risk Assessment:   Sensory Perception: 3-->slightly limited  Moisture: 3-->occasionally moist  Activity: 3-->walks occasionally  Mobility: 3-->slightly limited  Nutrition: 3-->adequate  Friction and Shear: 2-->potential problem  Dex Score: 17                          Labs:   Recent Labs   Lab 04/23/21  0535 04/22/21  1927 04/17/21  0842 04/17/21  0842   ALBUMIN 2.5*  --   --   --    HGB 9.0* 9.0*  --  11.9*   WBC 12.3* 12.4*   < >  --    A1C  --   --   --  6.7*   CRP  --  160.0*  --   --     < > = values in this interval not displayed.       Physical Exam  Skin inspection: focused LLE    Wound Location:  Right anterior lower leg  Date of last photo:  4-23-21 4-23-21 right leg blister close-up      4-23-21 right medial lower leg/foot      Wound History: pt states the blister developed very fast, basically overnight; pt has difficulty lifting RLE right now  Measurements (length x width x depth, in cm):  Blister is approx 6.5 x 8cm, raised 1+cm   Wound Base: blister is semi-firm jelly-like yellow material; base not very visible, but could see some pink tissue under the gelatinous mass when cleansing and probing area  Tunneling: N/A  Undermining: N/A  Palpation of the wound bed: firm blister  Periwound skin: intact, very edematous, taut, erythematous  Color: pink and purple  Temperature: warm  Drainage: copious  Description of drainage: yellow  Odor: none  Pain: moderate    Interventions  Current support surface: Standard  Atmos Air mattress  Current off-loading measures: Pillows  Visual inspection of wound(s) completed  Wound Care: done per  plan of care except pt declined EdemaWear to RLE today  Supplies: placed at the bedside  Education provided: importance of repositioning, plan of care, wound progress, Infection prevention , Moisture management and Off-loading pressure  Discussed plan of care with Patient, Nurse and Physician Dr. Key who was in room    Yesy Cervantes RN, CWOCN

## 2021-04-23 NOTE — CONSULTS
Consult Date: 04/22/2021    HISTORY OF PRESENT ILLNESS:  The patient is a 55-year-old male who is well known to my clinic.  He is a week out from right total knee arthroplasty that was uncomplicated.  He states that he was doing very well until early yesterday afternoon, he developed a slight blister over his ankle and it expanded.  He had some progressive erythema extending up adjacent to his surgical wound.    MEDICATIONS:  At time of admission include Xarelto for DVT prophylaxis, oxycodone for pain.      The patient reports that he has had no recent fever, chills, nausea, vomiting, diarrhea, chest pain or shortness of breath and states he has actually been feeling quite well and has minimal pain in the leg.  He was seen in my clinic. At that time felt exam was consistent with cellulitis without evidence of deep space infection involving the knee, so I recommended we admit him for IV antibiotics.    PHYSICAL EXAMINATION:  He is afebrile.  Vital signs are stable.  LUNGS:  Clear to auscultation bilaterally.   HEART:  Cardiovascular exam demonstrates a regular rate and rhythm.  ABDOMEN:  Benign.  EXTREMITIES:  Examination of the right lower extremity, he has erythema that extends from the anterior portion of his knee down to and including the foot and ankle.  He has an approximately 5-6 cm blister on the anterolateral aspect of the ankle.  He is able to flex and extend the ankle and toes.  He is able to straight leg raise without pain.  The demarcation area that was marked in my clinic is unchanged.    IMPRESSION:  A 55-year-old male, 1 week status post right total knee arthroplasty, now has significant cellulitis and blistering of his right lower extremity.  Plan is to admit him, start him on IV antibiotics, make him n.p.o. after midnight.  If the erythema does not improve, may have to consider surgical treatment.  At this time, I cannot see evidence of anything worrisome such as necrotizing fasciitis, but that is a  concern.    Molly Owens MD        D: 2021   T: 2021   MT: LEW    Name:     STORM VARGASRashida  MRN:      [id number]        Account:      [id number]   :      1965           Consult Date: 2021     Document: [id number]

## 2021-04-23 NOTE — PLAN OF CARE
PT/lymph: Consult received. Chart reviewed and discussed with RN. Pt admitted with severe right LE cellulitis. Will await 48hrs of antibiotics to ensure improvement of cellulitis prior to lymph wrap initiation.

## 2021-04-24 LAB
ANION GAP SERPL CALCULATED.3IONS-SCNC: 3 MMOL/L (ref 3–14)
BUN SERPL-MCNC: 16 MG/DL (ref 7–30)
CALCIUM SERPL-MCNC: 8.2 MG/DL (ref 8.5–10.1)
CHLORIDE SERPL-SCNC: 103 MMOL/L (ref 94–109)
CO2 SERPL-SCNC: 30 MMOL/L (ref 20–32)
CREAT SERPL-MCNC: 0.95 MG/DL (ref 0.66–1.25)
ERYTHROCYTE [DISTWIDTH] IN BLOOD BY AUTOMATED COUNT: 14.2 % (ref 10–15)
GFR SERPL CREATININE-BSD FRML MDRD: 88 ML/MIN/{1.73_M2}
GLUCOSE BLDC GLUCOMTR-MCNC: 103 MG/DL (ref 70–99)
GLUCOSE BLDC GLUCOMTR-MCNC: 107 MG/DL (ref 70–99)
GLUCOSE BLDC GLUCOMTR-MCNC: 148 MG/DL (ref 70–99)
GLUCOSE BLDC GLUCOMTR-MCNC: 171 MG/DL (ref 70–99)
GLUCOSE BLDC GLUCOMTR-MCNC: 180 MG/DL (ref 70–99)
GLUCOSE SERPL-MCNC: 114 MG/DL (ref 70–99)
HCT VFR BLD AUTO: 28.9 % (ref 40–53)
HGB BLD-MCNC: 9.1 G/DL (ref 13.3–17.7)
MCH RBC QN AUTO: 28.6 PG (ref 26.5–33)
MCHC RBC AUTO-ENTMCNC: 31.5 G/DL (ref 31.5–36.5)
MCV RBC AUTO: 91 FL (ref 78–100)
PLATELET # BLD AUTO: 381 10E9/L (ref 150–450)
POTASSIUM SERPL-SCNC: 3.7 MMOL/L (ref 3.4–5.3)
RBC # BLD AUTO: 3.18 10E12/L (ref 4.4–5.9)
SODIUM SERPL-SCNC: 136 MMOL/L (ref 133–144)
VANCOMYCIN SERPL-MCNC: 5.5 MG/L
WBC # BLD AUTO: 12.4 10E9/L (ref 4–11)

## 2021-04-24 PROCEDURE — 80202 ASSAY OF VANCOMYCIN: CPT | Performed by: PHYSICIAN ASSISTANT

## 2021-04-24 PROCEDURE — 80048 BASIC METABOLIC PNL TOTAL CA: CPT | Performed by: INTERNAL MEDICINE

## 2021-04-24 PROCEDURE — 250N000013 HC RX MED GY IP 250 OP 250 PS 637: Performed by: INTERNAL MEDICINE

## 2021-04-24 PROCEDURE — 120N000001 HC R&B MED SURG/OB

## 2021-04-24 PROCEDURE — 36415 COLL VENOUS BLD VENIPUNCTURE: CPT | Performed by: PHYSICIAN ASSISTANT

## 2021-04-24 PROCEDURE — 250N000011 HC RX IP 250 OP 636: Performed by: INTERNAL MEDICINE

## 2021-04-24 PROCEDURE — 85027 COMPLETE CBC AUTOMATED: CPT | Performed by: INTERNAL MEDICINE

## 2021-04-24 PROCEDURE — 250N000013 HC RX MED GY IP 250 OP 250 PS 637: Performed by: PHYSICIAN ASSISTANT

## 2021-04-24 PROCEDURE — 999N001017 HC STATISTIC GLUCOSE BY METER IP

## 2021-04-24 PROCEDURE — 250N000009 HC RX 250: Performed by: INTERNAL MEDICINE

## 2021-04-24 PROCEDURE — 99232 SBSQ HOSP IP/OBS MODERATE 35: CPT | Performed by: INTERNAL MEDICINE

## 2021-04-24 PROCEDURE — 250N000012 HC RX MED GY IP 250 OP 636 PS 637: Performed by: PHYSICIAN ASSISTANT

## 2021-04-24 RX ADMIN — Medication 1 CAPSULE: at 21:03

## 2021-04-24 RX ADMIN — CLINDAMYCIN PHOSPHATE 900 MG: 900 INJECTION, SOLUTION INTRAVENOUS at 13:53

## 2021-04-24 RX ADMIN — OXYCODONE HYDROCHLORIDE 10 MG: 5 TABLET ORAL at 00:03

## 2021-04-24 RX ADMIN — Medication 1 CAPSULE: at 09:10

## 2021-04-24 RX ADMIN — HYDROXYZINE HYDROCHLORIDE 25 MG: 25 TABLET, FILM COATED ORAL at 12:39

## 2021-04-24 RX ADMIN — LOSARTAN POTASSIUM 50 MG: 50 TABLET, FILM COATED ORAL at 09:10

## 2021-04-24 RX ADMIN — HYDROXYZINE HYDROCHLORIDE 25 MG: 25 TABLET, FILM COATED ORAL at 18:25

## 2021-04-24 RX ADMIN — OXYCODONE HYDROCHLORIDE 10 MG: 5 TABLET ORAL at 18:25

## 2021-04-24 RX ADMIN — RIVAROXABAN 10 MG: 10 TABLET, FILM COATED ORAL at 18:25

## 2021-04-24 RX ADMIN — OXYCODONE HYDROCHLORIDE 10 MG: 5 TABLET ORAL at 09:10

## 2021-04-24 RX ADMIN — CLINDAMYCIN PHOSPHATE 900 MG: 900 INJECTION, SOLUTION INTRAVENOUS at 05:33

## 2021-04-24 RX ADMIN — METOPROLOL SUCCINATE 25 MG: 25 TABLET, EXTENDED RELEASE ORAL at 09:10

## 2021-04-24 RX ADMIN — ATORVASTATIN CALCIUM 80 MG: 40 TABLET, FILM COATED ORAL at 21:03

## 2021-04-24 RX ADMIN — OXYCODONE HYDROCHLORIDE 10 MG: 5 TABLET ORAL at 21:31

## 2021-04-24 RX ADMIN — FUROSEMIDE 40 MG: 40 TABLET ORAL at 09:10

## 2021-04-24 RX ADMIN — INSULIN ASPART 1 UNITS: 100 INJECTION, SOLUTION INTRAVENOUS; SUBCUTANEOUS at 18:25

## 2021-04-24 RX ADMIN — MULTIPLE VITAMINS W/ MINERALS TAB 1 TABLET: TAB at 09:10

## 2021-04-24 RX ADMIN — FUROSEMIDE 20 MG: 20 TABLET ORAL at 21:03

## 2021-04-24 RX ADMIN — CLINDAMYCIN PHOSPHATE 900 MG: 900 INJECTION, SOLUTION INTRAVENOUS at 21:03

## 2021-04-24 RX ADMIN — OXYCODONE HYDROCHLORIDE 10 MG: 5 TABLET ORAL at 12:39

## 2021-04-24 RX ADMIN — CEFTRIAXONE SODIUM 2 G: 2 INJECTION, POWDER, FOR SOLUTION INTRAMUSCULAR; INTRAVENOUS at 12:39

## 2021-04-24 RX ADMIN — SENNOSIDES AND DOCUSATE SODIUM 1 TABLET: 8.6; 5 TABLET ORAL at 09:10

## 2021-04-24 RX ADMIN — OXYCODONE HYDROCHLORIDE 10 MG: 5 TABLET ORAL at 15:26

## 2021-04-24 RX ADMIN — OXYCODONE HYDROCHLORIDE 10 MG: 5 TABLET ORAL at 05:33

## 2021-04-24 ASSESSMENT — ACTIVITIES OF DAILY LIVING (ADL)
ADLS_ACUITY_SCORE: 17

## 2021-04-24 NOTE — PLAN OF CARE
PT/Lymphedema: Orders received and chart reviewed. Per discussion with pt, reports he is open to lymphedema wraps although doesn't feel he will be able to tolerate on  R LE at this time secondary to pain. Patient currently has yellow Edema Wear on L LE and patient reports tolerating. Discussed with RN to hold Lymphedema evaluation until 4/26 to reassess.

## 2021-04-24 NOTE — PLAN OF CARE
Date/Time: 4/24/2021 nights    Trauma/Ortho/Medical (Choose one): ortho    Diagnosis: RLE cellulitis  POD#: NA  Mental Status: Aox4  Activity/dangle: Adjust as swathi. Did not get out of bed this shift.  Diet: mod CHO.   Pain: 8/10 in RLE. Imporved to 5-6/10 with oxycodone Prn and atarax PRN.  Topete/Voiding: no void this shift  Tele/Restraints/Iso: NA  02/LDA: VSS on RA. PIV SL.   D/C Date: 3-4 days pending clinical improvement.   Other Info: intermittent abx. R calf dressing changed x2. R calf blister with erythema surrounding. Blood glucose checks.

## 2021-04-24 NOTE — PLAN OF CARE
A&O x 4, VSS on RA, pain controlled with oral analgesics, drsg CDI, CMS intact, up with assist of 1 WGB, voiding adequately in bathroom, IV SL,  redness improving, moderate amount of drainage tolerating, continue to monitor.

## 2021-04-24 NOTE — PROGRESS NOTES
Calvin Damon  2021  POD # 8 sp tka with cellulitis.    Admit Date:  2021      Doing well.  Pain well-controlled.  Objective:  Blood pressure 118/72, pulse 86, temperature 99.4  F (37.4  C), temperature source Oral, resp. rate 18, SpO2 95 %.    Temperatures:  Current - Temp: 99.4  F (37.4  C); Max - Temp  Av.2  F (37.3  C)  Min: 98.3  F (36.8  C)  Max: 100.5  F (38.1  C)  Pulse range: Pulse  Av.3  Min: 86  Max: 104  Blood pressure range: Systolic (24hrs), Av , Min:118 , Max:140   ; Diastolic (24hrs), Av, Min:57, Max:72    Exam:  CMS: intact  alert, stable, wound ok  Cellulitis improving      Labs:  Recent Labs   Lab Test 21  0535 21  0842   POTASSIUM 3.8 3.7 4.0     Recent Labs   Lab Test 21  0535 21  0842   HGB 9.0* 9.0* 11.9*     No results for input(s): INR in the last 11427 hours.  Recent Labs   Lab Test 21  0535 21    335       PLAN: Touch down weight bearing  Pain control measures  Cont IV antibiotics. Will likely discharge to home Monday on orals.

## 2021-04-24 NOTE — PROGRESS NOTES
"[facility]    Hospitalist Progress Note    Date of Service (when I saw the patient): 04/24/2021    Assessment & Plan   Calvin Damon is a 55 year old male who was admitted on 4/22/2021.    Assessment & Plan     Calvin Damon is a 55 year old male with PMHx of osteoarthritis, CAD with prior stenting to the OM1 (2017), hypertension, dyslipidemia, NIDDM, obesity, tobacco use, chronic venous insufficiency, asthma, hx of provoked DVT and s/p R TKA on 4/16/21 who was directly admitted under the Orthopedic service for evaluation of acute, severe RLE cellulitis. Hospitalist service was contacted for medical co-management.     Sepsis secondary to severe RLE cellulitis with bulla  Osteoarthritis s/p right TKA: Meeting sepsis criteria with tachycardia, WBC 12.4. US DVT LE negative.   * During my assessment, pain well managed, good cap refill in RLE, no numbness/tingling, pulses 2/4. No profound weakness noted     Ortho is primary, appreciate input. No plan for OR at this time.    ID consult requested, appreciate assistance     Per Dr. Oh, \"Very streptococcal appearing cellulitis with impressive blistering. MRSA PCR Is negative so stop vancomycin Stop zosyn and start ceftriaxone + clindamycin.  Anticipate at least 48 - 72 hours of IV antibiotics.\"    Wound nurse consulted, appreciate assistance     Demarcate cellulitic area, RLE yellow bulla ruptured with some oozing     Lymphedema consult requested to see if we can wrap the legs to help with swelling     Elevate the RLE on 3 pillows to help with swelling    RLE doppler negative for DVT       CAD s/p PTCA with stenting of OM1 (2017, [place])  HTN, dyslipidemia:     Continue PTA Lipitor 80 mg at bedtime.     Continue PTA losartan 50 mg/d and Toprol-XL 25 mg every morning.  Hold parameters in place.       Chronic lower extremity edema  Lymphedema  Managed PTA with lasix 40 mg every morning and 20 mg every afternoon.    Compression stockings " "at home     Continue PTA dosing     lymphedema consult requested    DM2, non-insulin dependent, controlled: Managed PTA with glipizide XL 10 mg/d. A1C 4/17/21 was 6.7.     Hold PTA glipizide, resume at discharge.      Medium intensity sliding scale insulin.      Blood sugar readings are at goal    Glucose checks QID and at 2AM.      Hypoglycemic protocol.         Obesity   FARHAN    Body mass index is 42.25 kg/m .  Increase in all-cause morbidity and mortality.     Patient would benefit from even modest weight loss    Follow up with PCP regarding ongoing management.      Continue CPAP with home settings.       Tobacco Use D/O: Quit cold turkey since most recent surgery. Previously smoked about half pack per day.      Counseled regarding cessation.      Patient declined nicotine replacement therapy        History of asthma  Not currently on any medications.       History of provoked DVT    continue Xarelto for DVT prophylaxis per Ortho      DVT Prophylaxis: xarelto   Code Status: Full Code    Disposition: Possibly ready for discharge on Monday, depends on response to antibiotics and local cares    Poppy Soler MD  Hospitalist  [facility]  Text Page (7am - 6pm, M-F)        Interval History   \"The redness is better.\"  Patient says redness on his right leg has receded within the marked lines.  He says his pain is reasonably well controlled.  He has no new respiratory or GI complaints.  He says he is looking forward to possible discharge on Monday.    -Data reviewed today: I reviewed all new labs and imaging results over the last 24 hours. I personally reviewed the x-ray of the fibula and tibia, showing postoperative changes of right total knee arthroplasty.    Physical Exam   Temp: 99.4  F (37.4  C) Temp src: Oral BP: 118/72 Pulse: 86   Resp: 18 SpO2: 95 % O2 Device: None (Room air)    There were no vitals filed for this visit.  Vital Signs with Ranges  Temp:  [98.3  F (36.8  C)-100.5  F (38.1  C)] " 99.4  F (37.4  C)  Pulse:  [] 86  Resp:  [16-18] 18  BP: (118-140)/(57-72) 118/72  SpO2:  [93 %-98 %] 95 %  I/O last 3 completed shifts:  In: -   Out: 600 [Urine:600]    Constitutional: Awake, alert, cooperative, no apparent distress  Respiratory: Clear to auscultation bilaterally, no crackles or wheezing  Cardiovascular: Regular rate and rhythm, normal S1 and S2, and no murmur noted  GI: Normal bowel sounds, soft, non-distended, non-tender  Skin/Integumen: Right leg has edema from the foot to the mid thigh.  There are numerous staples in a right total knee arthroplasty incision.  There is no real redness or drainage from the incision.  The right leg has a gauze dressing covering the right calf.  Patient showed me a photograph of the right leg from today, he has a large, tense bulla on the right lower extremity, and there is redness which is inside lines drawn with a marker.  Leg is warm.    Medications       atorvastatin  80 mg Oral QPM     cefTRIAXone  2 g Intravenous Q24H     clindamycin  900 mg Intravenous Q8H     furosemide  20 mg Oral QPM     furosemide  40 mg Oral QAM     [Held by provider] glipiZIDE  10 mg Oral QAM     insulin aspart  1-7 Units Subcutaneous TID AC     insulin aspart  1-5 Units Subcutaneous At Bedtime     lactobacillus rhamnosus (GG)  1 capsule Oral BID     losartan  50 mg Oral QAM     metoprolol succinate ER  25 mg Oral QAM     multivitamin w/minerals  1 tablet Oral QAM     rivaroxaban ANTICOAGULANT  10 mg Oral Daily with supper     senna-docusate  1-2 tablet Oral BID       Data   Recent Labs   Lab 04/23/21  0535 04/22/21  1927 04/17/21  0842   WBC 12.3* 12.4*  --    HGB 9.0* 9.0* 11.9*   MCV 90 89  --     335  --     134 139   POTASSIUM 3.8 3.7 4.0   CHLORIDE 102 100 107   CO2 31 31 28   BUN 17 18 23   CR 0.92 0.85 0.82   ANIONGAP 2* 3 4   SIDNEY 8.2* 8.3* 8.5   GLC 87 146* 122*   ALBUMIN 2.5*  --   --    PROTTOTAL 6.2*  --   --    BILITOTAL 1.0  --   --    ALKPHOS 75  --    --    ALT 40  --   --    AST 32  --   --        No results found for this or any previous visit (from the past 24 hour(s)).

## 2021-04-25 LAB
GLUCOSE BLDC GLUCOMTR-MCNC: 114 MG/DL (ref 70–99)
GLUCOSE BLDC GLUCOMTR-MCNC: 121 MG/DL (ref 70–99)
GLUCOSE BLDC GLUCOMTR-MCNC: 137 MG/DL (ref 70–99)
GLUCOSE BLDC GLUCOMTR-MCNC: 148 MG/DL (ref 70–99)
GLUCOSE BLDC GLUCOMTR-MCNC: 155 MG/DL (ref 70–99)

## 2021-04-25 PROCEDURE — 250N000013 HC RX MED GY IP 250 OP 250 PS 637: Performed by: PHYSICIAN ASSISTANT

## 2021-04-25 PROCEDURE — 250N000011 HC RX IP 250 OP 636: Performed by: INTERNAL MEDICINE

## 2021-04-25 PROCEDURE — 250N000009 HC RX 250: Performed by: INTERNAL MEDICINE

## 2021-04-25 PROCEDURE — 99232 SBSQ HOSP IP/OBS MODERATE 35: CPT | Performed by: INTERNAL MEDICINE

## 2021-04-25 PROCEDURE — 120N000001 HC R&B MED SURG/OB

## 2021-04-25 PROCEDURE — 999N001017 HC STATISTIC GLUCOSE BY METER IP

## 2021-04-25 PROCEDURE — 250N000013 HC RX MED GY IP 250 OP 250 PS 637: Performed by: INTERNAL MEDICINE

## 2021-04-25 RX ADMIN — OXYCODONE HYDROCHLORIDE 10 MG: 5 TABLET ORAL at 16:05

## 2021-04-25 RX ADMIN — INSULIN ASPART 1 UNITS: 100 INJECTION, SOLUTION INTRAVENOUS; SUBCUTANEOUS at 09:18

## 2021-04-25 RX ADMIN — CLINDAMYCIN PHOSPHATE 900 MG: 900 INJECTION, SOLUTION INTRAVENOUS at 13:26

## 2021-04-25 RX ADMIN — OXYCODONE HYDROCHLORIDE 10 MG: 5 TABLET ORAL at 01:40

## 2021-04-25 RX ADMIN — INSULIN ASPART 1 UNITS: 100 INJECTION, SOLUTION INTRAVENOUS; SUBCUTANEOUS at 13:22

## 2021-04-25 RX ADMIN — CLINDAMYCIN PHOSPHATE 900 MG: 900 INJECTION, SOLUTION INTRAVENOUS at 05:40

## 2021-04-25 RX ADMIN — OXYCODONE HYDROCHLORIDE 10 MG: 5 TABLET ORAL at 09:16

## 2021-04-25 RX ADMIN — OXYCODONE HYDROCHLORIDE 10 MG: 5 TABLET ORAL at 19:36

## 2021-04-25 RX ADMIN — Medication 1 CAPSULE: at 09:17

## 2021-04-25 RX ADMIN — CLINDAMYCIN PHOSPHATE 900 MG: 900 INJECTION, SOLUTION INTRAVENOUS at 21:35

## 2021-04-25 RX ADMIN — MULTIPLE VITAMINS W/ MINERALS TAB 1 TABLET: TAB at 09:17

## 2021-04-25 RX ADMIN — SENNOSIDES AND DOCUSATE SODIUM 1 TABLET: 8.6; 5 TABLET ORAL at 09:16

## 2021-04-25 RX ADMIN — FUROSEMIDE 20 MG: 20 TABLET ORAL at 19:37

## 2021-04-25 RX ADMIN — HYDROXYZINE HYDROCHLORIDE 25 MG: 25 TABLET, FILM COATED ORAL at 19:36

## 2021-04-25 RX ADMIN — METOPROLOL SUCCINATE 25 MG: 25 TABLET, EXTENDED RELEASE ORAL at 09:17

## 2021-04-25 RX ADMIN — ATORVASTATIN CALCIUM 80 MG: 40 TABLET, FILM COATED ORAL at 19:36

## 2021-04-25 RX ADMIN — HYDROXYZINE HYDROCHLORIDE 25 MG: 25 TABLET, FILM COATED ORAL at 12:05

## 2021-04-25 RX ADMIN — OXYCODONE HYDROCHLORIDE 10 MG: 5 TABLET ORAL at 22:37

## 2021-04-25 RX ADMIN — OXYCODONE HYDROCHLORIDE 10 MG: 5 TABLET ORAL at 05:41

## 2021-04-25 RX ADMIN — CEFTRIAXONE SODIUM 2 G: 2 INJECTION, POWDER, FOR SOLUTION INTRAMUSCULAR; INTRAVENOUS at 12:05

## 2021-04-25 RX ADMIN — Medication 1 CAPSULE: at 21:35

## 2021-04-25 RX ADMIN — RIVAROXABAN 10 MG: 10 TABLET, FILM COATED ORAL at 16:05

## 2021-04-25 RX ADMIN — LOSARTAN POTASSIUM 50 MG: 50 TABLET, FILM COATED ORAL at 09:17

## 2021-04-25 RX ADMIN — OXYCODONE HYDROCHLORIDE 10 MG: 5 TABLET ORAL at 12:05

## 2021-04-25 RX ADMIN — FUROSEMIDE 40 MG: 40 TABLET ORAL at 09:17

## 2021-04-25 ASSESSMENT — ACTIVITIES OF DAILY LIVING (ADL)
ADLS_ACUITY_SCORE: 19
ADLS_ACUITY_SCORE: 19
ADLS_ACUITY_SCORE: 17
ADLS_ACUITY_SCORE: 18
ADLS_ACUITY_SCORE: 19
ADLS_ACUITY_SCORE: 18

## 2021-04-25 NOTE — PROGRESS NOTES
"[facility]    Hospitalist Progress Note    Date of Service (when I saw the patient): 04/25/2021    Assessment & Plan   Calvin Damon is a 55 year old male who was admitted on 4/22/2021.    Assessment & Plan     Calvin Damon is a 55 year old male with PMHx of osteoarthritis, CAD with prior stenting to the OM1 (2017), hypertension, dyslipidemia, NIDDM, obesity, tobacco use, chronic venous insufficiency, asthma, hx of provoked DVT and s/p R TKA on 4/16/21 who was directly admitted under the Orthopedic service for evaluation of acute, severe RLE cellulitis. Hospitalist service was contacted for medical co-management.     Sepsis secondary to severe RLE cellulitis with bulla  Osteoarthritis s/p right TKA: Meeting sepsis criteria with tachycardia, WBC 12.4. US DVT LE negative.   * During my assessment, pain well managed, good cap refill in RLE, no numbness/tingling, pulses 2/4. No profound weakness noted     Ortho is primary, appreciate input. No plan for OR at this time.    ID consult requested, appreciate assistance     Per Dr. Oh, \"Very streptococcal appearing cellulitis with impressive blistering. MRSA PCR Is negative so stop vancomycin Stop zosyn and start ceftriaxone + clindamycin.  Anticipate at least 48 - 72 hours of IV antibiotics.\"    Wound nurse consulted, appreciate assistance     Demarcate cellulitic area    Lymphedema consult requested to see if we can wrap the legs to help with swelling     Elevate the RLE, use foam pillow, to help with swelling    RLE doppler negative for DVT       CAD s/p PTCA with stenting of OM1 (2017, [employer])  HTN, dyslipidemia:     Continue PTA Lipitor 80 mg at bedtime.     Continue PTA losartan 50 mg/d and Toprol-XL 25 mg every morning.  Hold parameters in place.       Chronic lower extremity edema  Lymphedema  Managed PTA with lasix 40 mg every morning and 20 mg every afternoon.    Compression stockings at home     Continue PTA " "dosing     lymphedema consult requested    DM2, non-insulin dependent, controlled: Managed PTA with glipizide XL 10 mg/d. A1C 4/17/21 was 6.7.     Hold PTA glipizide, resume at discharge.      Medium intensity sliding scale insulin.      Blood sugar readings are at goal    Glucose checks QID and at 2AM.      Hypoglycemic protocol.         Obesity   FARHAN    Body mass index is 42.25 kg/m , increases all-cause morbidity and mortality.     Patient would benefit from even modest weight loss    Follow up with PCP regarding ongoing management.      Continue CPAP with home settings.       Tobacco Use D/O: Quit cold turkey since most recent surgery. Previously smoked about half pack per day.      Counseled regarding cessation.      Patient declined nicotine replacement therapy        History of asthma  Not currently on any medications.       History of provoked DVT    continue Xarelto for DVT prophylaxis per Ortho      DVT Prophylaxis: xarelto   Code Status: Full Code    Disposition: Possibly ready for discharge on Monday, depends on response to antibiotics and local cares, including elevation    Poppy Soler MD  Hospitalist  [facility]  Text Page (7am - 6pm, M-F)        Interval History   \" I am going home tomorrow.\"  Patient says the redness in his leg has improved, he is planning to go home tomorrow.  He acknowledges that there is increased swelling throughout much of the leg, including the thigh.  He has no new respiratory or GI complaints.    -Data reviewed today: I reviewed all new labs and imaging results over the last 24 hours. I personally reviewed the x-ray of the fibula and tibia, showing postoperative changes of right total knee arthroplasty.    Physical Exam   Temp: 99  F (37.2  C) Temp src: Oral BP: (!) 135/104 Pulse: 79   Resp: 16 SpO2: 98 % O2 Device: None (Room air)    There were no vitals filed for this visit.  Vital Signs with Ranges  Temp:  [98.6  F (37  C)-99  F (37.2  C)] 99  F (37.2 "  C)  Pulse:  [79-87] 79  Resp:  [16] 16  BP: (108-135)/() 135/104  SpO2:  [93 %-98 %] 98 %  I/O last 3 completed shifts:  In: 1010 [P.O.:1010]  Out: 2250 [Urine:2250]    Constitutional: Awake, alert, cooperative, no apparent distress  Respiratory: Clear to auscultation bilaterally, no crackles or wheezing  Cardiovascular: Regular rate and rhythm  GI: Normal bowel sounds, soft, non-distended, non-tender  Skin/Integumen: Right leg has edema from the foot to the mid thigh.  There is a long incision overlying the right knee, with no redness or drainage from the incision.  The right leg has a gauze dressing covering the right calf.  Patient showed me a photograph of the right leg, he has a large, tense bulla on the right lower extremity, and there is intense redness, inside lines drawn with a marker.  Leg is warm.    Medications       atorvastatin  80 mg Oral QPM     cefTRIAXone  2 g Intravenous Q24H     clindamycin  900 mg Intravenous Q8H     furosemide  20 mg Oral QPM     furosemide  40 mg Oral QAM     [Held by provider] glipiZIDE  10 mg Oral QAM     insulin aspart  1-7 Units Subcutaneous TID AC     insulin aspart  1-5 Units Subcutaneous At Bedtime     lactobacillus rhamnosus (GG)  1 capsule Oral BID     losartan  50 mg Oral QAM     metoprolol succinate ER  25 mg Oral QAM     multivitamin w/minerals  1 tablet Oral QAM     rivaroxaban ANTICOAGULANT  10 mg Oral Daily with supper     senna-docusate  1-2 tablet Oral BID       Data   Recent Labs   Lab 04/24/21  0859 04/23/21  0535 04/22/21  1927   WBC 12.4* 12.3* 12.4*   HGB 9.1* 9.0* 9.0*   MCV 91 90 89    365 335    135 134   POTASSIUM 3.7 3.8 3.7   CHLORIDE 103 102 100   CO2 30 31 31   BUN 16 17 18   CR 0.95 0.92 0.85   ANIONGAP 3 2* 3   SIDNEY 8.2* 8.2* 8.3*   * 87 146*   ALBUMIN  --  2.5*  --    PROTTOTAL  --  6.2*  --    BILITOTAL  --  1.0  --    ALKPHOS  --  75  --    ALT  --  40  --    AST  --  32  --        No results found for this or any  previous visit (from the past 24 hour(s)).

## 2021-04-25 NOTE — PLAN OF CARE
A&O x 4, VSS on RA, pain controlled with oral analgesics, drsg CI with moderate drainage, CMS intact, redness noted on the RLE, up with assist of 1 walker, voiding adequately in the urinal, IV SL, tolerating IV ABX, continue to monitor.

## 2021-04-25 NOTE — PLAN OF CARE
A&Ox4. Assist Standby and walker. VSS on RA. RLE +2 edema, redness and serous drainage. Dressing scant amt drainage Pain controlled with Oxy. Tolerating Mod Carb Diet. Progressing per POC. Will Cont to monitor.

## 2021-04-25 NOTE — PROGRESS NOTES
Calvin SOLANO Nelson  2021  POD # 9, s/p right TKA with cellulitis    Pt sitting up in bed resting comfortably. Reports that his pain is better and is not taking narcotics as often. Tolerating PO   Blood pressure (!) 135/104, pulse 79, temperature 99  F (37.2  C), temperature source Oral, resp. rate 16, SpO2 98 %.  Temperatures:  Current - Temp: 99  F (37.2  C); Max - Temp  Av.8  F (37.1  C)  Min: 98.6  F (37  C)  Max: 99  F (37.2  C)  Pulse range: Pulse  Av  Min: 79  Max: 87  Blood pressure range: Systolic (24hrs), Av , Min:108 , Max:135   ; Diastolic (24hrs), Av, Min:61, Max:104    CMS: intact, alert and oriented, cellulitis seems to be improving, distal NV intact  Labs:  Hemoglobin   Date Value Ref Range Status   2021 9.1 (L) 13.3 - 17.7 g/dL Final   ]  No results found for: INR  Lab Results   Component Value Date     2021       PLAN: continue with plan per Dr. Owens, continue IV abx and defer to Dr. Owens's team for discharge discussion.

## 2021-04-26 ENCOUNTER — NURSE TRIAGE (OUTPATIENT)
Dept: NURSING | Facility: CLINIC | Age: 56
End: 2021-04-26

## 2021-04-26 VITALS
HEART RATE: 81 BPM | OXYGEN SATURATION: 92 % | DIASTOLIC BLOOD PRESSURE: 67 MMHG | RESPIRATION RATE: 16 BRPM | TEMPERATURE: 98.7 F | SYSTOLIC BLOOD PRESSURE: 132 MMHG

## 2021-04-26 LAB
ERYTHROCYTE [DISTWIDTH] IN BLOOD BY AUTOMATED COUNT: 14.1 % (ref 10–15)
GLUCOSE BLDC GLUCOMTR-MCNC: 143 MG/DL (ref 70–99)
HCT VFR BLD AUTO: 28.2 % (ref 40–53)
HGB BLD-MCNC: 9.1 G/DL (ref 13.3–17.7)
MCH RBC QN AUTO: 29.4 PG (ref 26.5–33)
MCHC RBC AUTO-ENTMCNC: 32.3 G/DL (ref 31.5–36.5)
MCV RBC AUTO: 91 FL (ref 78–100)
PLATELET # BLD AUTO: 433 10E9/L (ref 150–450)
RBC # BLD AUTO: 3.1 10E12/L (ref 4.4–5.9)
WBC # BLD AUTO: 12.4 10E9/L (ref 4–11)

## 2021-04-26 PROCEDURE — 99233 SBSQ HOSP IP/OBS HIGH 50: CPT | Performed by: INTERNAL MEDICINE

## 2021-04-26 PROCEDURE — 999N001017 HC STATISTIC GLUCOSE BY METER IP

## 2021-04-26 PROCEDURE — 36415 COLL VENOUS BLD VENIPUNCTURE: CPT | Performed by: INTERNAL MEDICINE

## 2021-04-26 PROCEDURE — 99207 PR MOONLIGHTING INDICATOR: CPT | Performed by: INTERNAL MEDICINE

## 2021-04-26 PROCEDURE — 250N000013 HC RX MED GY IP 250 OP 250 PS 637: Performed by: INTERNAL MEDICINE

## 2021-04-26 PROCEDURE — 250N000013 HC RX MED GY IP 250 OP 250 PS 637: Performed by: PHYSICIAN ASSISTANT

## 2021-04-26 PROCEDURE — 999N000147 HC STATISTIC PT IP EVAL DEFER

## 2021-04-26 PROCEDURE — 85027 COMPLETE CBC AUTOMATED: CPT | Performed by: INTERNAL MEDICINE

## 2021-04-26 PROCEDURE — 250N000009 HC RX 250: Performed by: INTERNAL MEDICINE

## 2021-04-26 RX ORDER — OXYCODONE HYDROCHLORIDE 5 MG/1
5-10 TABLET ORAL
Qty: 30 TABLET | Refills: 0 | Status: SHIPPED | OUTPATIENT
Start: 2021-04-26

## 2021-04-26 RX ORDER — CLINDAMYCIN HCL 300 MG
300 CAPSULE ORAL EVERY 8 HOURS SCHEDULED
Status: DISCONTINUED | OUTPATIENT
Start: 2021-04-26 | End: 2021-04-26 | Stop reason: HOSPADM

## 2021-04-26 RX ORDER — CEPHALEXIN 500 MG/1
500 CAPSULE ORAL 4 TIMES DAILY
Qty: 28 CAPSULE | Refills: 0 | Status: SHIPPED | OUTPATIENT
Start: 2021-04-26 | End: 2021-05-03

## 2021-04-26 RX ORDER — ACETAMINOPHEN 325 MG/1
650 TABLET ORAL EVERY 4 HOURS PRN
Qty: 100 TABLET | Refills: 0 | Status: SHIPPED | OUTPATIENT
Start: 2021-04-26

## 2021-04-26 RX ORDER — CLINDAMYCIN HCL 300 MG
300 CAPSULE ORAL EVERY 8 HOURS
Qty: 21 CAPSULE | Refills: 0 | Status: SHIPPED | OUTPATIENT
Start: 2021-04-26 | End: 2021-05-03

## 2021-04-26 RX ORDER — HYDROXYZINE HYDROCHLORIDE 25 MG/1
25 TABLET, FILM COATED ORAL EVERY 6 HOURS PRN
Qty: 30 TABLET | Refills: 0 | Status: SHIPPED | OUTPATIENT
Start: 2021-04-26

## 2021-04-26 RX ADMIN — OXYCODONE HYDROCHLORIDE 5 MG: 5 TABLET ORAL at 08:04

## 2021-04-26 RX ADMIN — OXYCODONE HYDROCHLORIDE 10 MG: 5 TABLET ORAL at 01:52

## 2021-04-26 RX ADMIN — CLINDAMYCIN PHOSPHATE 900 MG: 900 INJECTION, SOLUTION INTRAVENOUS at 05:04

## 2021-04-26 RX ADMIN — Medication 1 CAPSULE: at 08:04

## 2021-04-26 RX ADMIN — OXYCODONE HYDROCHLORIDE 10 MG: 5 TABLET ORAL at 05:05

## 2021-04-26 RX ADMIN — FUROSEMIDE 40 MG: 40 TABLET ORAL at 08:04

## 2021-04-26 RX ADMIN — OXYCODONE HYDROCHLORIDE 10 MG: 5 TABLET ORAL at 11:42

## 2021-04-26 RX ADMIN — LOSARTAN POTASSIUM 50 MG: 50 TABLET, FILM COATED ORAL at 08:04

## 2021-04-26 RX ADMIN — METOPROLOL SUCCINATE 25 MG: 25 TABLET, EXTENDED RELEASE ORAL at 08:04

## 2021-04-26 RX ADMIN — MULTIPLE VITAMINS W/ MINERALS TAB 1 TABLET: TAB at 08:04

## 2021-04-26 RX ADMIN — HYDROXYZINE HYDROCHLORIDE 25 MG: 25 TABLET, FILM COATED ORAL at 01:52

## 2021-04-26 ASSESSMENT — ACTIVITIES OF DAILY LIVING (ADL)
ADLS_ACUITY_SCORE: 19

## 2021-04-26 NOTE — PLAN OF CARE
Pt is a/o. Oxycodone given for RLE pain. BID dressing last done @ 10am. Instructions reviewed with pt and his wife. IV removed and belongings take with.

## 2021-04-26 NOTE — PLAN OF CARE
PT/Lymph: Orders received, chart reviewed. Pt not appropriate for lymphedema wraps at this time, ortho in agreement. Pt mobilizing mod I with FWW, limited by pain in RLE. Reviewed supine TKA exercises, pt tolerates R knee flexion ~ 45*. Limited tolerance for ROM exercises at this time. Pt safe to discharge to home from mobility standpoint, will defer further PT recommendations to Ortho team.

## 2021-04-26 NOTE — PROGRESS NOTES
"[facility]  Hospitalist Progress Note  Date of Service (when I saw the patient): 04/26/2021    Summary:  Calvin Damon is a 55 year old year old male who has a PMH significant for CAD with prior stenting to the OM1 (2017), hypertension, dyslipidemia, NIDDM, obesity, tobacco use, chronic venous insufficiency, asthma, hx of provoked DVT and s/p R TKA on 4/16/21. Pt was admitted to [facility] on 4/22/2021 under the Orthopedic service after for evaluation of acute, severe RLE cellulitis. Hospitalist service was contacted for medical co-management.  The following problems were addressed during his hospitalization:    Assessment & Plan:  Sepsis secondary to severe RLE cellulitis with bulla  Osteoarthritis s/p right TKA: Meeting sepsis criteria with tachycardia, WBC 12.4. US DVT LE negative. RLE venous duplex negative for DVT  * During my assessment, pain well managed, good cap refill in RLE, no numbness/tingling, pulses 2/4. No profound weakness noted   ? Ortho is primary, No plan for OR at this time.  ? ID consult requested, appreciate assistance   ? Per Dr. Oh, \"Very streptococcal appearing cellulitis with impressive blistering. MRSA PCR Is negative so stopped vancomycin and zosyn and started on ceftriaxone + clindamycin 4/23.\"  ? Wound nurse consulted, appreciate recs  ? Lymphedema consult requested to see if we can wrap the legs to help with swelling   ? Elevate the RLE, use foam pillow, to help with swelling    CAD s/p PTCA with stenting of OM1 (2017, [employer])  HTN, dyslipidemia:   ? Continue PTA Lipitor 80 mg at bedtime.   ? Continue PTA losartan 50 mg/d and Toprol-XL 25 mg every morning.     Chronic lower extremity edema  Lymphedema  Managed PTA with lasix 40 mg every morning and 20 mg every afternoon.  ? [place] on LLE, no compression on RLE per ortho, just elevate  ? Continue PTA dosing of Lasix  ? Lymphedema consult requested, Pt not appropriate for " lymphedema wraps at this time, ortho in agreement     DM2, non-insulin dependent, controlled: Managed PTA with glipizide XL 10 mg/d. A1C 4/17/21 was 6.7.   ? Hold PTA glipizide while in hospital, resume at discharge.    ? Medium intensity sliding scale insulin while in hospital      Obesity   (does NOT have FARHAN and has never used CPAP)  ? Body mass index is 42.25 kg/m , increases all-cause morbidity and mortality.   ? Patient would benefit from even modest weight loss  ? Follow up with PCP regarding ongoing management.       Tobacco Use D/O: Quit cold turkey since most recent surgery. Previously smoked about half pack per day.    ? Counseled regarding cessation  ? Patient declined nicotine replacement therapy        History of asthma  Not currently on any medications.       History of provoked DVT  ? Continue Xarelto for DVT prophylaxis per Ortho     DVT Prophylaxis: Defer to primary service (Xarelto)  Code Status: Full Code  Disposition: Expected discharge later today per ortho    The patient's care was discussed with the Attending Physician, Dr. Brady, Bedside Nurse, Care Coordinator/, and Patient.    Betty GORDILLO-SHANI      Interval History   Feeling well today. Able to ambulate and mobilize with walker. Elevating RLE on foam block. Denies fevers, chills, nausea, vomiting, chest pain, difficulty breathing, headache, or confusion. Voiding and moving bowels per usual.     -Data reviewed today: I reviewed all new labs and imaging results over the last 24 hours. I personally reviewed no images or EKG's today.    Physical Exam   Temp: 98.7  F (37.1  C) Temp src: Oral BP: 132/67 Pulse: 81   Resp: 16 SpO2: 92 % O2 Device: None (Room air)    There were no vitals filed for this visit.  Vital Signs with Ranges  Temp:  [98.7  F (37.1  C)-99.6  F (37.6  C)] 98.7  F (37.1  C)  Pulse:  [81-86] 81  Resp:  [15-16] 16  BP: (111-132)/(56-71) 132/67  SpO2:  [92 %-94 %] 92 %  I/O last 3 completed shifts:  In: 840  [P.O.:840]  Out: 1900 [Urine:1900]    Constitutional: alert, oriented, no acute distress  Eyes: No scleral icterus or injection  ENT: Normocephalic, atraumatic, moist mucus membranes  Respiratory: No secondary muscle use or labored breathing. Lungs clear to auscultation bilaterally without wheezes or rhonchi   Cardiovascular: RRR without murmur  GI: Abdomen soft, non-tender to palpation, non-distended.  Bowel sounds active  MSK: able to move extremities on command without new weakness, walks without weakness or ataxia  Skin/Integumen: warm, dry, redness receding from line of demarcation on RLE    Lower extremities 4/22 on admission:      RLE 4/26/2021:      Medications       atorvastatin  80 mg Oral QPM     cefTRIAXone  2 g Intravenous Q24H     clindamycin  900 mg Intravenous Q8H     furosemide  20 mg Oral QPM     furosemide  40 mg Oral QAM     [Held by provider] glipiZIDE  10 mg Oral QAM     insulin aspart  1-7 Units Subcutaneous TID AC     insulin aspart  1-5 Units Subcutaneous At Bedtime     lactobacillus rhamnosus (GG)  1 capsule Oral BID     losartan  50 mg Oral QAM     metoprolol succinate ER  25 mg Oral QAM     multivitamin w/minerals  1 tablet Oral QAM     rivaroxaban ANTICOAGULANT  10 mg Oral Daily with supper     senna-docusate  1-2 tablet Oral BID       Data   Recent Labs   Lab 04/26/21  0557 04/24/21  0859 04/23/21  0535 04/22/21  1927   WBC 12.4* 12.4* 12.3* 12.4*   HGB 9.1* 9.1* 9.0* 9.0*   MCV 91 91 90 89    381 365 335   NA  --  136 135 134   POTASSIUM  --  3.7 3.8 3.7   CHLORIDE  --  103 102 100   CO2  --  30 31 31   BUN  --  16 17 18   CR  --  0.95 0.92 0.85   ANIONGAP  --  3 2* 3   SIDNEY  --  8.2* 8.2* 8.3*   GLC  --  114* 87 146*   ALBUMIN  --   --  2.5*  --    PROTTOTAL  --   --  6.2*  --    BILITOTAL  --   --  1.0  --    ALKPHOS  --   --  75  --    ALT  --   --  40  --    AST  --   --  32  --        No results found for this or any previous visit (from the past 24 hour(s)).

## 2021-04-26 NOTE — DISCHARGE SUMMARY
Orthopedic Discharge Summary   Patient: Calvin Damon  Admission Date: 4/22/2021  Discharge Date: 4/26/2021  Date of Service: 4/26/2021  Attending Provider: Molly Owens MD  Admission Diagnosis: Cellulitis of right leg [L03.115]  Discharge Diagnosis: cellulitis of the right leg status post RTKA  Procedures Performed: none  Complications: None apparent   History of Present Illness: see operative report for full HPI  Past Medical History:   Past Medical History:   Diagnosis Date     CAD (coronary artery disease)      Diabetes (H)      H/O blood clots      Hypertension      Uncomplicated asthma      Patient Active Problem List   Diagnosis     Status post total knee replacement, right     Cellulitis of right leg     Past Surgical History:   Procedure Laterality Date     CARDIAC SURGERY  01/2017    stent     HERNIA REPAIR      umbilical     ORTHOPEDIC SURGERY      right arthroscopic shoulder     ORTHOPEDIC SURGERY Left 2007    plate upper arm     ORTHOPEDIC SURGERY Right 2018    ankle repair     ORTHOPEDIC SURGERY Right 07/2020    arthroscopy knee     ORTHOPEDIC SURGERY Right 1997    knee arthroscopy     Social History     Socioeconomic History     Marital status:      Spouse name: Not on file     Number of children: Not on file     Years of education: Not on file     Highest education level: Not on file   Occupational History     Not on file   Social Needs     Financial resource strain: Not on file     Food insecurity     Worry: Not on file     Inability: Not on file     Transportation needs     Medical: Not on file     Non-medical: Not on file   Tobacco Use     Smoking status: Current Every Day Smoker     Packs/day: 0.25     Types: Cigarettes     Smokeless tobacco: Never Used   Substance and Sexual Activity     Alcohol use: Never     Frequency: Never     Drug use: Not on file     Sexual activity: Not on file   Lifestyle     Physical activity     Days per week: Not on file     Minutes per session: Not on  file     Stress: Not on file   Relationships     Social connections     Talks on phone: Not on file     Gets together: Not on file     Attends [employer] service: Not on file     Active member of club or organization: Not on file     Attends meetings of clubs or organizations: Not on file     Relationship status: Not on file     Intimate partner violence     Fear of current or ex partner: Not on file     Emotionally abused: Not on file     Physically abused: Not on file     Forced sexual activity: Not on file   Other Topics Concern     Parent/sibling w/ CABG, MI or angioplasty before 65F 55M? Not Asked   Social History Narrative     Not on file     Medications on admission:   Medications Prior to Admission   Medication Sig Dispense Refill Last Dose     acetaminophen (TYLENOL) 325 MG tablet Take 2 tablets (650 mg) by mouth every 4 hours as needed for other (mild pain) 100 tablet 0 4/22/2021 at 0600     Ascorbic Acid (VITAMIN C) 500 MG CAPS Take 500 mg by mouth every morning    4/22/2021 at AM     atorvastatin (LIPITOR) 80 MG tablet Take 80 mg by mouth every evening   4/21/2021 at PM     Coenzyme Q10 (COQ10) 200 MG CAPS Take 200 mg by mouth every morning    4/22/2021 at AM     furosemide (LASIX) 20 MG tablet Take 40 mg by mouth every morning (2 X 20 mg) (in addition to PM dose)   4/22/2021 at AM     furosemide (LASIX) 20 MG tablet Take 20 mg by mouth every evening (in addition to morning dose)   4/21/2021 at PM     glipiZIDE (GLUCOTROL XL) 10 MG 24 hr tablet Take 10 mg by mouth every morning    4/22/2021 at AM     hydrOXYzine (ATARAX) 25 MG tablet Take 1 tablet (25 mg) by mouth every 6 hours as needed for itching or anxiety (with pain, moderate pain) 30 tablet 0 Past Week at Unknown time     lactobacillus rhamnosus, GG, (CULTURELL) capsule Take 1 capsule by mouth every morning    4/22/2021 at AM     losartan (COZAAR) 50 MG tablet Take 50 mg by mouth every morning    4/22/2021 at AM     metoprolol succinate ER  (TOPROL-XL) 25 MG 24 hr tablet Take 25 mg by mouth every morning    4/22/2021 at AM     Multiple Vitamins-Minerals (ONE-A-DAY MENS 50+) TABS Take 1 tablet by mouth every morning    4/22/2021 at AM     rivaroxaban ANTICOAGULANT (XARELTO) 10 MG TABS tablet Take 1 tablet (10 mg) by mouth daily (with dinner) 30 tablet 0 4/21/2021 at PM     senna-docusate (SENOKOT-S/PERICOLACE) 8.6-50 MG tablet Take 1-2 tablets by mouth 2 times daily Take while on oral narcotics to prevent or treat constipation. 30 tablet 0 4/21/2021 at Unknown time     traMADol (ULTRAM) 50 MG tablet Take 50 mg by mouth 2 times daily as needed for severe pain   4/22/2021 at AM     nitroGLYcerin (NITROSTAT) 0.4 MG sublingual tablet Place 0.4 mg under the tongue daily as needed for chest pain For chest pain place 1 tablet under the tongue every 5 minutes for 3 doses. If symptoms persist 5 minutes after 1st dose call 911.   More than a month at Unknown time     Allergies:    Allergies   Allergen Reactions     Metformin Diarrhea     Synvisc [Hylan G-F 20] Rash       Hospital Course: Patient was admitted to Orthopedic where he underwent the above named procedure and received iv antibiotics. Postoperatively he did very well with no apparent complications, and he had an uneventful hospital stay. He was given antibiotics for the duration of his stay in the hospital. He was given xarelto for DVT prophylaxis and his pain was well controlled with oral meds, then transitioned to oral pain medications during his hospital stay. He progressed well with physical therapy and discharge to home was recommended. His chronic medical problems were followed by the medicine team during his hospital stay and there were no significant changes to conditions or treatment plans. No new medical problems presented during his hospital stay.   Consultations Obtained During Hospitalization:  1. Internal medicine for management of comorbid conditions and home medication management.  2.  Physical Therapy for gait training, mobilization, range of motion and strengthening exercises  3. Occupational Therapy for activities of daily living.  4. Social Work for disposition planning.  Active Problems:    Cellulitis of right leg    Discharge Disposition: patient improving  Discharge Diet: resume normal pre op diet  Discharge Medications:   Current Discharge Medication List      START taking these medications    Details   !! acetaminophen (TYLENOL) 325 MG tablet Take 2 tablets (650 mg) by mouth every 4 hours as needed for other (mild pain)  Qty: 100 tablet, Refills: 0    Associated Diagnoses: Cellulitis of right leg      cephALEXin (KEFLEX) 500 MG capsule Take 1 capsule (500 mg) by mouth 4 times daily for 7 days  Qty: 28 capsule, Refills: 0    Associated Diagnoses: Cellulitis of right leg      !! hydrOXYzine (ATARAX) 25 MG tablet Take 1 tablet (25 mg) by mouth every 6 hours as needed for itching or anxiety (with pain, moderate pain)  Qty: 30 tablet, Refills: 0    Associated Diagnoses: Cellulitis of right leg       !! - Potential duplicate medications found. Please discuss with provider.      CONTINUE these medications which have CHANGED    Details   oxyCODONE (ROXICODONE) 5 MG tablet Take 1-2 tablets (5-10 mg) by mouth every 3 hours as needed for pain (Moderate to Severe)  Qty: 30 tablet, Refills: 0    Associated Diagnoses: Cellulitis of right leg         CONTINUE these medications which have NOT CHANGED    Details   !! acetaminophen (TYLENOL) 325 MG tablet Take 2 tablets (650 mg) by mouth every 4 hours as needed for other (mild pain)  Qty: 100 tablet, Refills: 0    Associated Diagnoses: Status post total right knee replacement      Ascorbic Acid (VITAMIN C) 500 MG CAPS Take 500 mg by mouth every morning       atorvastatin (LIPITOR) 80 MG tablet Take 80 mg by mouth every evening      Coenzyme Q10 (COQ10) 200 MG CAPS Take 200 mg by mouth every morning       !! furosemide (LASIX) 20 MG tablet Take 40 mg by  mouth every morning (2 X 20 mg) (in addition to PM dose)      !! furosemide (LASIX) 20 MG tablet Take 20 mg by mouth every evening (in addition to morning dose)      glipiZIDE (GLUCOTROL XL) 10 MG 24 hr tablet Take 10 mg by mouth every morning       !! hydrOXYzine (ATARAX) 25 MG tablet Take 1 tablet (25 mg) by mouth every 6 hours as needed for itching or anxiety (with pain, moderate pain)  Qty: 30 tablet, Refills: 0    Associated Diagnoses: Status post total right knee replacement      lactobacillus rhamnosus, GG, (CULTURELL) capsule Take 1 capsule by mouth every morning       losartan (COZAAR) 50 MG tablet Take 50 mg by mouth every morning       metoprolol succinate ER (TOPROL-XL) 25 MG 24 hr tablet Take 25 mg by mouth every morning       Multiple Vitamins-Minerals (ONE-A-DAY MENS 50+) TABS Take 1 tablet by mouth every morning       rivaroxaban ANTICOAGULANT (XARELTO) 10 MG TABS tablet Take 1 tablet (10 mg) by mouth daily (with dinner)  Qty: 30 tablet, Refills: 0    Associated Diagnoses: Status post total right knee replacement      senna-docusate (SENOKOT-S/PERICOLACE) 8.6-50 MG tablet Take 1-2 tablets by mouth 2 times daily Take while on oral narcotics to prevent or treat constipation.  Qty: 30 tablet, Refills: 0    Comments: While taking narcotics  Associated Diagnoses: Status post total right knee replacement      traMADol (ULTRAM) 50 MG tablet Take 50 mg by mouth 2 times daily as needed for severe pain      nitroGLYcerin (NITROSTAT) 0.4 MG sublingual tablet Place 0.4 mg under the tongue daily as needed for chest pain For chest pain place 1 tablet under the tongue every 5 minutes for 3 doses. If symptoms persist 5 minutes after 1st dose call 911.       !! - Potential duplicate medications found. Please discuss with provider.        Code Status:   X-rays needed at the follow up visit:   Mario Parker PA-C  4/26/2021 9:31 AM   MAY Guevara  [phone]

## 2021-04-26 NOTE — PROGRESS NOTES
Calvin SOLANO Nelson  2021  POD # 10    Admit Date:  2021      Doing well.  Clean wound without signs of infection.  Normal healing wound.  No immediate surgical complications identified.  No excessive bleeding  Pain well-controlled.  Tolerating physical therapy and rehabilitation well.  Objective:  Blood pressure 132/67, pulse 81, temperature 98.7  F (37.1  C), temperature source Oral, resp. rate 16, SpO2 92 %.    Temperatures:  Current - Temp: 98.7  F (37.1  C); Max - Temp  Av.2  F (37.3  C)  Min: 98.7  F (37.1  C)  Max: 99.6  F (37.6  C)  Pulse range: Pulse  Av.7  Min: 81  Max: 86  Blood pressure range: Systolic (24hrs), Av , Min:111 , Max:132   ; Diastolic (24hrs), Av, Min:56, Max:71    Exam:  CMS: intact  alert, stable, wound ok  Cellulitis much improved.       Labs:  Recent Labs   Lab Test 21  0859 21  0535 21  1927   POTASSIUM 3.7 3.8 3.7     Recent Labs   Lab Test 21  0557 21  0859 21  0535   HGB 9.1* 9.1* 9.0*     No results for input(s): INR in the last 30484 hours.  Recent Labs   Lab Test 21  0557 21  0859 21  0535    381 [id number]       PLAN: Weight bearing as tolerated  Start physical therapy  Pain control measures  Switch to oral antibiotics today. Discharge to home today. RTC to clinic on Thursday.

## 2021-04-26 NOTE — PROVIDER NOTIFICATION
MD Notification    Notified Person: MD    Notified Person Name:  Adriano    Notification Date/Time: 4/26/21 @ 0930    Notification Interaction:    Purpose of Notification: Ortho signed off, I did not see ID sign off. Did you want to add anything else?     Orders Received: Cleocin added.     Comments:

## 2021-04-26 NOTE — PLAN OF CARE
Pt A&O x4. VSS on RA. Up SBA w/ TTWB. Voiding adequately in urinal. Redness and swelling to LLE, dressing change completed. Pain managed w/ prn oxycodone and atarax. Tolerating Mod CHO diet,  & 143. Possible discharge home today on oral abx.

## 2021-04-27 NOTE — TELEPHONE ENCOUNTER
Pt wife called in states Pt is discharge form the hospital today.  Pt was admitted last week Thursday  because of post surgery infection.  Pt is post op day 10 of knee replacement.  It is his right leg.  The wife states his leg from his knee all the way ankle area increased redness.  Pt has 100.4 temp oral.  The Pt is also has pain but taking oxycodone.  Pt is taking 2 antibiotic medication.  clindamycin (CLEOCIN) 300 MG capsule.  cephALEXin (KEFLEX) 500 MG capsule.   The disposition is to be seen with in 4 hours.  Care advice given per protocol.  Patient agrees with care advice given.   Agreed to call back if he has additional symptoms or questions.      Lisandro Santana [place] Nurse Advisor 4/26/2021 9:53 PM        Additional Information    Negative: [1] Major abdominal surgical incision AND [2] wound gaping open AND [3] visible internal organs    Negative: Sounds like a life-threatening emergency to the triager    Negative: [1] Bleeding from incision AND [2] won't stop after 10 minutes of direct pressure    Negative: [1] Widespread rash AND [2] bright red, sunburn-like    Negative: Severe pain in the incision    Negative: [1] Incision gaping open AND [2] < 48 hours since wound re-opened    Negative: [1] Incision gaping open AND [2] length of opening > 2 inches (5 cm)    Negative: Patient sounds very sick or weak to the triager    Negative: Sounds like a serious complication to the triager    [1] Cellulitis AND [2] taking an antibiotic    Negative: Shock suspected (e.g., cold/pale/clammy skin, too weak to stand, low BP, rapid pulse)    Negative: Sounds like a life-threatening emergency to the triager    Surgical wound infection suspected (post-op)    Fever > 100.4 F (38.0 C)    Protocols used: POST-OP INCISION SYMPTOMS-A-AH, INFECTION ON ANTIBIOTIC FOLLOW-UP CALL-A-, CELLULITIS ON ANTIBIOTIC FOLLOW-UP CALL-A-AH

## 2021-04-28 LAB
BACTERIA SPEC CULT: NO GROWTH
BACTERIA SPEC CULT: NO GROWTH
Lab: NORMAL
Lab: NORMAL
SPECIMEN SOURCE: NORMAL
SPECIMEN SOURCE: NORMAL

## 2022-03-17 ENCOUNTER — LAB REQUISITION (OUTPATIENT)
Dept: LAB | Facility: CLINIC | Age: 57
End: 2022-03-17
Payer: COMMERCIAL

## 2022-03-17 DIAGNOSIS — N39.0 URINARY TRACT INFECTION, SITE NOT SPECIFIED: ICD-10-CM

## 2022-03-17 LAB
ALBUMIN UR-MCNC: 10 MG/DL
APPEARANCE UR: ABNORMAL
BACTERIA #/AREA URNS HPF: ABNORMAL /HPF
BILIRUB UR QL STRIP: NEGATIVE
CAOX CRY #/AREA URNS HPF: ABNORMAL /HPF
COLOR UR AUTO: YELLOW
GLUCOSE UR STRIP-MCNC: NEGATIVE MG/DL
HGB UR QL STRIP: NEGATIVE
HYALINE CASTS: 1 /LPF
KETONES UR STRIP-MCNC: NEGATIVE MG/DL
LEUKOCYTE ESTERASE UR QL STRIP: NEGATIVE
MUCOUS THREADS #/AREA URNS LPF: PRESENT /LPF
NITRATE UR QL: NEGATIVE
PH UR STRIP: 5.5 [PH] (ref 5–7)
RBC URINE: 1 /HPF
SP GR UR STRIP: 1.03 (ref 1–1.03)
UROBILINOGEN UR STRIP-MCNC: <2 MG/DL
WBC URINE: 2 /HPF

## 2022-03-17 PROCEDURE — 87086 URINE CULTURE/COLONY COUNT: CPT | Mod: ORL | Performed by: NURSE PRACTITIONER

## 2022-03-17 PROCEDURE — 81001 URINALYSIS AUTO W/SCOPE: CPT | Mod: ORL | Performed by: NURSE PRACTITIONER

## 2022-03-19 LAB — BACTERIA UR CULT: NORMAL

## (undated) DEVICE — NDL SPINAL 18GA 3.5" 405184

## (undated) DEVICE — SOL NACL 0.9% IRRIG 3000ML BAG 2B7477

## (undated) DEVICE — BONE CEMENT MIXEVAC III HI VAC KIT  0206-015-000

## (undated) DEVICE — SU MONOCRYL 3-0 PS-2 27" Y427H

## (undated) DEVICE — DRAPE STERI TOWEL LG 1010

## (undated) DEVICE — BLADE SAW RECIP STRK 70X12.5X1.2MM 0277-096-281

## (undated) DEVICE — SYR 50ML LL W/O NDL 309653

## (undated) DEVICE — GLOVE PROTEXIS BLUE W/NEU-THERA 8.0  2D73EB80

## (undated) DEVICE — SU STRATAFIX PDS PLUS 0 CT-1 18" SXPP1A401

## (undated) DEVICE — SU ETHIBOND 0 CT-1 CR 8X18" CX21D

## (undated) DEVICE — BONE CLEANING TIP INTERPULSE  0210-010-000

## (undated) DEVICE — CAST PADDING 6" UNSTERILE 9046

## (undated) DEVICE — CAST PADDING 6" STERILE 9046S

## (undated) DEVICE — GLOVE PROTEXIS W/NEU-THERA 8.0  2D73TE80

## (undated) DEVICE — ESU GROUND PAD UNIVERSAL W/O CORD

## (undated) DEVICE — SYR 50ML CATH TIP W/O NDL 309620

## (undated) DEVICE — DRSG STERI STRIP 1/2X4" R1547

## (undated) DEVICE — SU STRATAFIX PDS PLUS 0 SPIRAL CT-1 15CM SXPP1B406

## (undated) DEVICE — SOL BENZOIN 0.5OZ

## (undated) DEVICE — SU DERMABOND PRINEO 22CM CLR222US

## (undated) DEVICE — DRSG GAUZE 4X4" 3033

## (undated) DEVICE — DRAPE IOBAN INCISE 23X17" 6650EZ

## (undated) DEVICE — SOL WATER IRRIG 1000ML BOTTLE 2F7114

## (undated) DEVICE — WRAP EZY KNEE

## (undated) DEVICE — PACK TOTAL KNEE SOP15TKFSD

## (undated) DEVICE — PREP CHLORAPREP 26ML TINTED ORANGE  260815

## (undated) DEVICE — LINEN TOWEL PACK X5 5464

## (undated) DEVICE — BLADE SAW SAGITTAL STRK 18X90X1.19MM HD SYS 6 6118-119-090

## (undated) DEVICE — SUCTION IRR SYSTEM W/O TIP INTERPULSE HANDPIECE 0210-100-000

## (undated) DEVICE — DRSG ADAPTIC 3X8" 6113

## (undated) DEVICE — SOLUTION WOUND CLEANSING 3/4OZ 10% PVP EA-L3011FB-50

## (undated) DEVICE — SU VICRYL 0 CP-1 27" J467H

## (undated) DEVICE — DRSG KERLIX FLUFFS X5

## (undated) DEVICE — GOWN IMPERVIOUS SPECIALTY XL/XLONG 39049

## (undated) DEVICE — GLOVE PROTEXIS MICRO 8.0  2D73PM80

## (undated) DEVICE — MANIFOLD NEPTUNE 4 PORT 700-20

## (undated) DEVICE — SU VICRYL 2-0 CP-1 27" UND J266H

## (undated) RX ORDER — FENTANYL CITRATE 50 UG/ML
INJECTION, SOLUTION INTRAMUSCULAR; INTRAVENOUS
Status: DISPENSED
Start: 2021-04-16

## (undated) RX ORDER — ONDANSETRON 2 MG/ML
INJECTION INTRAMUSCULAR; INTRAVENOUS
Status: DISPENSED
Start: 2021-04-16

## (undated) RX ORDER — CEFAZOLIN SODIUM 1 G/3ML
INJECTION, POWDER, FOR SOLUTION INTRAMUSCULAR; INTRAVENOUS
Status: DISPENSED
Start: 2021-04-16

## (undated) RX ORDER — LIDOCAINE HYDROCHLORIDE 20 MG/ML
INJECTION, SOLUTION EPIDURAL; INFILTRATION; INTRACAUDAL; PERINEURAL
Status: DISPENSED
Start: 2021-04-16

## (undated) RX ORDER — PROPOFOL 10 MG/ML
INJECTION, EMULSION INTRAVENOUS
Status: DISPENSED
Start: 2021-04-16

## (undated) RX ORDER — GLYCOPYRROLATE 0.2 MG/ML
INJECTION, SOLUTION INTRAMUSCULAR; INTRAVENOUS
Status: DISPENSED
Start: 2021-04-16

## (undated) RX ORDER — ALBUMIN, HUMAN INJ 5% 5 %
SOLUTION INTRAVENOUS
Status: DISPENSED
Start: 2021-04-16

## (undated) RX ORDER — NEOSTIGMINE METHYLSULFATE 1 MG/ML
VIAL (ML) INJECTION
Status: DISPENSED
Start: 2021-04-16

## (undated) RX ORDER — DEXAMETHASONE SODIUM PHOSPHATE 4 MG/ML
INJECTION, SOLUTION INTRA-ARTICULAR; INTRALESIONAL; INTRAMUSCULAR; INTRAVENOUS; SOFT TISSUE
Status: DISPENSED
Start: 2021-04-16